# Patient Record
Sex: MALE | Employment: FULL TIME | URBAN - METROPOLITAN AREA
[De-identification: names, ages, dates, MRNs, and addresses within clinical notes are randomized per-mention and may not be internally consistent; named-entity substitution may affect disease eponyms.]

---

## 2019-02-04 ENCOUNTER — HOSPITAL ENCOUNTER (OUTPATIENT)
Age: 54
Setting detail: OBSERVATION
Discharge: HOME | DRG: 683 | End: 2019-02-06
Attending: EMERGENCY MEDICINE | Admitting: GENERAL ACUTE CARE HOSPITAL
Payer: COMMERCIAL

## 2019-02-04 ENCOUNTER — APPOINTMENT (OUTPATIENT)
Dept: GENERAL RADIOLOGY | Age: 54
DRG: 683 | End: 2019-02-04
Attending: EMERGENCY MEDICINE
Payer: COMMERCIAL

## 2019-02-04 ENCOUNTER — APPOINTMENT (OUTPATIENT)
Dept: ULTRASOUND IMAGING | Age: 54
DRG: 683 | End: 2019-02-04
Attending: EMERGENCY MEDICINE
Payer: COMMERCIAL

## 2019-02-04 ENCOUNTER — APPOINTMENT (OUTPATIENT)
Dept: CT IMAGING | Age: 54
DRG: 683 | End: 2019-02-04
Attending: EMERGENCY MEDICINE
Payer: COMMERCIAL

## 2019-02-04 DIAGNOSIS — E86.0 DEHYDRATION: ICD-10-CM

## 2019-02-04 DIAGNOSIS — N17.9 ACUTE KIDNEY INJURY (HCC): Primary | ICD-10-CM

## 2019-02-04 LAB
ALBUMIN SERPL-MCNC: 3.2 G/DL (ref 3.5–5)
ALBUMIN/GLOB SERPL: 0.8 {RATIO} (ref 1.1–2.2)
ALP SERPL-CCNC: 68 U/L (ref 45–117)
ALT SERPL-CCNC: 33 U/L (ref 12–78)
AMORPH CRY URNS QL MICRO: ABNORMAL
ANION GAP SERPL CALC-SCNC: 10 MMOL/L (ref 5–15)
APPEARANCE UR: ABNORMAL
AST SERPL-CCNC: 33 U/L (ref 15–37)
ATRIAL RATE: 69 BPM
BACTERIA URNS QL MICRO: ABNORMAL /HPF
BASOPHILS # BLD: 0 K/UL (ref 0–0.1)
BASOPHILS NFR BLD: 0 % (ref 0–1)
BILIRUB SERPL-MCNC: 0.5 MG/DL (ref 0.2–1)
BILIRUB UR QL CFM: NEGATIVE
BNP SERPL-MCNC: 982 PG/ML (ref 0–125)
BUN SERPL-MCNC: 35 MG/DL (ref 6–20)
BUN/CREAT SERPL: 9 (ref 12–20)
CALCIUM SERPL-MCNC: 8 MG/DL (ref 8.5–10.1)
CALCULATED P AXIS, ECG09: 46 DEGREES
CALCULATED R AXIS, ECG10: 0 DEGREES
CALCULATED T AXIS, ECG11: 108 DEGREES
CHLORIDE SERPL-SCNC: 106 MMOL/L (ref 97–108)
CK SERPL-CCNC: 406 U/L (ref 39–308)
CO2 SERPL-SCNC: 24 MMOL/L (ref 21–32)
COLOR UR: ABNORMAL
CREAT SERPL-MCNC: 3.8 MG/DL (ref 0.7–1.3)
CREAT UR-MCNC: 296 MG/DL
CREAT UR-MCNC: 313 MG/DL
DIAGNOSIS, 93000: NORMAL
DIFFERENTIAL METHOD BLD: ABNORMAL
EOSINOPHIL # BLD: 0 K/UL (ref 0–0.4)
EOSINOPHIL NFR BLD: 0 % (ref 0–7)
EPITH CASTS URNS QL MICRO: ABNORMAL /LPF
ERYTHROCYTE [DISTWIDTH] IN BLOOD BY AUTOMATED COUNT: 13.3 % (ref 11.5–14.5)
GLOBULIN SER CALC-MCNC: 4.2 G/DL (ref 2–4)
GLUCOSE SERPL-MCNC: 142 MG/DL (ref 65–100)
GLUCOSE UR STRIP.AUTO-MCNC: NEGATIVE MG/DL
GRAN CASTS URNS QL MICRO: ABNORMAL /LPF
HCT VFR BLD AUTO: 48.2 % (ref 36.6–50.3)
HGB BLD-MCNC: 16.8 G/DL (ref 12.1–17)
HGB UR QL STRIP: NEGATIVE
IMM GRANULOCYTES # BLD AUTO: 0 K/UL (ref 0–0.04)
IMM GRANULOCYTES NFR BLD AUTO: 0 % (ref 0–0.5)
KETONES UR QL STRIP.AUTO: ABNORMAL MG/DL
LEUKOCYTE ESTERASE UR QL STRIP.AUTO: ABNORMAL
LYMPHOCYTES # BLD: 1.1 K/UL (ref 0.8–3.5)
LYMPHOCYTES NFR BLD: 12 % (ref 12–49)
MCH RBC QN AUTO: 32.2 PG (ref 26–34)
MCHC RBC AUTO-ENTMCNC: 34.9 G/DL (ref 30–36.5)
MCV RBC AUTO: 92.3 FL (ref 80–99)
MICROALBUMIN UR-MCNC: 69.5 MG/DL
MICROALBUMIN/CREAT UR-RTO: 235 MG/G (ref 0–30)
MONOCYTES # BLD: 1 K/UL (ref 0–1)
MONOCYTES NFR BLD: 11 % (ref 5–13)
NEUTS SEG # BLD: 6.5 K/UL (ref 1.8–8)
NEUTS SEG NFR BLD: 76 % (ref 32–75)
NITRITE UR QL STRIP.AUTO: NEGATIVE
NRBC # BLD: 0 K/UL (ref 0–0.01)
NRBC BLD-RTO: 0 PER 100 WBC
OTHER,OTHU: ABNORMAL
P-R INTERVAL, ECG05: 144 MS
PH UR STRIP: 5 [PH] (ref 5–8)
PLATELET # BLD AUTO: 120 K/UL (ref 150–400)
PMV BLD AUTO: 10.8 FL (ref 8.9–12.9)
POTASSIUM SERPL-SCNC: 4 MMOL/L (ref 3.5–5.1)
PROT SERPL-MCNC: 7.4 G/DL (ref 6.4–8.2)
PROT UR STRIP-MCNC: 100 MG/DL
Q-T INTERVAL, ECG07: 422 MS
QRS DURATION, ECG06: 88 MS
QTC CALCULATION (BEZET), ECG08: 452 MS
RBC # BLD AUTO: 5.22 M/UL (ref 4.1–5.7)
RBC #/AREA URNS HPF: ABNORMAL /HPF (ref 0–5)
SODIUM SERPL-SCNC: 140 MMOL/L (ref 136–145)
SODIUM UR-SCNC: 8 MMOL/L
SP GR UR REFRACTOMETRY: 1.03 (ref 1–1.03)
TROPONIN I SERPL-MCNC: 0.09 NG/ML
UA: UC IF INDICATED,UAUC: ABNORMAL
UROBILINOGEN UR QL STRIP.AUTO: 1 EU/DL (ref 0.2–1)
VENTRICULAR RATE, ECG03: 69 BPM
WBC # BLD AUTO: 8.6 K/UL (ref 4.1–11.1)
WBC URNS QL MICRO: ABNORMAL /HPF (ref 0–4)

## 2019-02-04 PROCEDURE — 84300 ASSAY OF URINE SODIUM: CPT

## 2019-02-04 PROCEDURE — 94760 N-INVAS EAR/PLS OXIMETRY 1: CPT

## 2019-02-04 PROCEDURE — 70450 CT HEAD/BRAIN W/O DYE: CPT

## 2019-02-04 PROCEDURE — 74011250636 HC RX REV CODE- 250/636: Performed by: EMERGENCY MEDICINE

## 2019-02-04 PROCEDURE — 93005 ELECTROCARDIOGRAM TRACING: CPT

## 2019-02-04 PROCEDURE — 77030029684 HC NEB SM VOL KT MONA -A

## 2019-02-04 PROCEDURE — 87186 SC STD MICRODIL/AGAR DIL: CPT

## 2019-02-04 PROCEDURE — 99218 HC RM OBSERVATION: CPT

## 2019-02-04 PROCEDURE — 81001 URINALYSIS AUTO W/SCOPE: CPT

## 2019-02-04 PROCEDURE — 87086 URINE CULTURE/COLONY COUNT: CPT

## 2019-02-04 PROCEDURE — 87077 CULTURE AEROBIC IDENTIFY: CPT

## 2019-02-04 PROCEDURE — 76770 US EXAM ABDO BACK WALL COMP: CPT

## 2019-02-04 PROCEDURE — 96361 HYDRATE IV INFUSION ADD-ON: CPT

## 2019-02-04 PROCEDURE — 74011000250 HC RX REV CODE- 250: Performed by: GENERAL ACUTE CARE HOSPITAL

## 2019-02-04 PROCEDURE — 96372 THER/PROPH/DIAG INJ SC/IM: CPT

## 2019-02-04 PROCEDURE — 96360 HYDRATION IV INFUSION INIT: CPT

## 2019-02-04 PROCEDURE — 94640 AIRWAY INHALATION TREATMENT: CPT

## 2019-02-04 PROCEDURE — 84484 ASSAY OF TROPONIN QUANT: CPT

## 2019-02-04 PROCEDURE — 71046 X-RAY EXAM CHEST 2 VIEWS: CPT

## 2019-02-04 PROCEDURE — 83880 ASSAY OF NATRIURETIC PEPTIDE: CPT

## 2019-02-04 PROCEDURE — 82043 UR ALBUMIN QUANTITATIVE: CPT

## 2019-02-04 PROCEDURE — 80053 COMPREHEN METABOLIC PANEL: CPT

## 2019-02-04 PROCEDURE — 74011250637 HC RX REV CODE- 250/637: Performed by: GENERAL ACUTE CARE HOSPITAL

## 2019-02-04 PROCEDURE — 85025 COMPLETE CBC W/AUTO DIFF WBC: CPT

## 2019-02-04 PROCEDURE — 82570 ASSAY OF URINE CREATININE: CPT

## 2019-02-04 PROCEDURE — 74011000250 HC RX REV CODE- 250: Performed by: EMERGENCY MEDICINE

## 2019-02-04 PROCEDURE — 36415 COLL VENOUS BLD VENIPUNCTURE: CPT

## 2019-02-04 PROCEDURE — 82550 ASSAY OF CK (CPK): CPT

## 2019-02-04 PROCEDURE — 99285 EMERGENCY DEPT VISIT HI MDM: CPT

## 2019-02-04 PROCEDURE — 74011250636 HC RX REV CODE- 250/636: Performed by: GENERAL ACUTE CARE HOSPITAL

## 2019-02-04 RX ORDER — LIDOCAINE HYDROCHLORIDE 20 MG/ML
15 SOLUTION OROPHARYNGEAL
Status: DISCONTINUED | OUTPATIENT
Start: 2019-02-04 | End: 2019-02-06 | Stop reason: HOSPADM

## 2019-02-04 RX ORDER — ONDANSETRON 2 MG/ML
4 INJECTION INTRAMUSCULAR; INTRAVENOUS
Status: DISCONTINUED | OUTPATIENT
Start: 2019-02-04 | End: 2019-02-06 | Stop reason: HOSPADM

## 2019-02-04 RX ORDER — IBUPROFEN 200 MG
400 TABLET ORAL
COMMUNITY
End: 2019-02-06

## 2019-02-04 RX ORDER — ASPIRIN 325 MG/1
100 TABLET, FILM COATED ORAL DAILY
Status: DISCONTINUED | OUTPATIENT
Start: 2019-02-05 | End: 2019-02-06 | Stop reason: HOSPADM

## 2019-02-04 RX ORDER — HEPARIN SODIUM 5000 [USP'U]/ML
5000 INJECTION, SOLUTION INTRAVENOUS; SUBCUTANEOUS EVERY 8 HOURS
Status: DISCONTINUED | OUTPATIENT
Start: 2019-02-04 | End: 2019-02-06 | Stop reason: HOSPADM

## 2019-02-04 RX ORDER — CLOPIDOGREL BISULFATE 75 MG/1
75 TABLET ORAL DAILY
COMMUNITY

## 2019-02-04 RX ORDER — IPRATROPIUM BROMIDE AND ALBUTEROL SULFATE 2.5; .5 MG/3ML; MG/3ML
3 SOLUTION RESPIRATORY (INHALATION)
Status: DISCONTINUED | OUTPATIENT
Start: 2019-02-04 | End: 2019-02-06 | Stop reason: HOSPADM

## 2019-02-04 RX ORDER — METOPROLOL SUCCINATE 50 MG/1
25 TABLET, EXTENDED RELEASE ORAL DAILY
Status: DISCONTINUED | OUTPATIENT
Start: 2019-02-05 | End: 2019-02-04

## 2019-02-04 RX ORDER — CLOPIDOGREL BISULFATE 75 MG/1
75 TABLET ORAL DAILY
Status: DISCONTINUED | OUTPATIENT
Start: 2019-02-05 | End: 2019-02-06 | Stop reason: HOSPADM

## 2019-02-04 RX ORDER — SODIUM CHLORIDE 9 MG/ML
75 INJECTION, SOLUTION INTRAVENOUS CONTINUOUS
Status: DISCONTINUED | OUTPATIENT
Start: 2019-02-04 | End: 2019-02-04 | Stop reason: SDUPTHER

## 2019-02-04 RX ORDER — FOLIC ACID 1 MG/1
1 TABLET ORAL DAILY
Status: DISCONTINUED | OUTPATIENT
Start: 2019-02-05 | End: 2019-02-06 | Stop reason: HOSPADM

## 2019-02-04 RX ORDER — AMLODIPINE BESYLATE 10 MG/1
10 TABLET ORAL DAILY
COMMUNITY

## 2019-02-04 RX ORDER — LORAZEPAM 2 MG/ML
2 INJECTION INTRAMUSCULAR
Status: DISCONTINUED | OUTPATIENT
Start: 2019-02-04 | End: 2019-02-06 | Stop reason: HOSPADM

## 2019-02-04 RX ORDER — IPRATROPIUM BROMIDE AND ALBUTEROL SULFATE 2.5; .5 MG/3ML; MG/3ML
3 SOLUTION RESPIRATORY (INHALATION)
Status: COMPLETED | OUTPATIENT
Start: 2019-02-04 | End: 2019-02-04

## 2019-02-04 RX ORDER — SODIUM CHLORIDE 9 MG/ML
75 INJECTION, SOLUTION INTRAVENOUS CONTINUOUS
Status: DISCONTINUED | OUTPATIENT
Start: 2019-02-04 | End: 2019-02-06 | Stop reason: HOSPADM

## 2019-02-04 RX ORDER — AMLODIPINE BESYLATE 5 MG/1
10 TABLET ORAL DAILY
Status: DISCONTINUED | OUTPATIENT
Start: 2019-02-05 | End: 2019-02-04

## 2019-02-04 RX ORDER — SODIUM CHLORIDE 0.9 % (FLUSH) 0.9 %
5-40 SYRINGE (ML) INJECTION AS NEEDED
Status: DISCONTINUED | OUTPATIENT
Start: 2019-02-04 | End: 2019-02-06 | Stop reason: HOSPADM

## 2019-02-04 RX ORDER — SODIUM CHLORIDE 0.9 % (FLUSH) 0.9 %
5-40 SYRINGE (ML) INJECTION EVERY 8 HOURS
Status: DISCONTINUED | OUTPATIENT
Start: 2019-02-04 | End: 2019-02-06 | Stop reason: HOSPADM

## 2019-02-04 RX ORDER — HYDRALAZINE HYDROCHLORIDE 20 MG/ML
10 INJECTION INTRAMUSCULAR; INTRAVENOUS
Status: DISCONTINUED | OUTPATIENT
Start: 2019-02-04 | End: 2019-02-06 | Stop reason: HOSPADM

## 2019-02-04 RX ORDER — LORAZEPAM 2 MG/ML
4 INJECTION INTRAMUSCULAR
Status: DISCONTINUED | OUTPATIENT
Start: 2019-02-04 | End: 2019-02-06 | Stop reason: HOSPADM

## 2019-02-04 RX ORDER — METOPROLOL SUCCINATE 50 MG/1
50 TABLET, EXTENDED RELEASE ORAL DAILY
Status: ON HOLD | COMMUNITY
End: 2022-07-12

## 2019-02-04 RX ORDER — IRBESARTAN 300 MG/1
300 TABLET ORAL DAILY
COMMUNITY
End: 2019-02-06

## 2019-02-04 RX ORDER — FLUTICASONE PROPIONATE 50 MCG
2 SPRAY, SUSPENSION (ML) NASAL
COMMUNITY

## 2019-02-04 RX ORDER — ACETAMINOPHEN 325 MG/1
650 TABLET ORAL
Status: DISCONTINUED | OUTPATIENT
Start: 2019-02-04 | End: 2019-02-06 | Stop reason: HOSPADM

## 2019-02-04 RX ADMIN — SODIUM CHLORIDE 1000 ML: 900 INJECTION, SOLUTION INTRAVENOUS at 12:04

## 2019-02-04 RX ADMIN — HEPARIN SODIUM 5000 UNITS: 5000 INJECTION INTRAVENOUS; SUBCUTANEOUS at 17:25

## 2019-02-04 RX ADMIN — ACETAMINOPHEN 650 MG: 325 TABLET ORAL at 19:59

## 2019-02-04 RX ADMIN — IPRATROPIUM BROMIDE AND ALBUTEROL SULFATE 3 ML: .5; 3 SOLUTION RESPIRATORY (INHALATION) at 12:03

## 2019-02-04 RX ADMIN — SODIUM CHLORIDE 1000 ML: 900 INJECTION, SOLUTION INTRAVENOUS at 13:34

## 2019-02-04 RX ADMIN — SODIUM CHLORIDE 75 ML/HR: 900 INJECTION, SOLUTION INTRAVENOUS at 17:28

## 2019-02-04 RX ADMIN — IPRATROPIUM BROMIDE AND ALBUTEROL SULFATE 3 ML: .5; 3 SOLUTION RESPIRATORY (INHALATION) at 19:21

## 2019-02-04 RX ADMIN — Medication 10 ML: at 17:28

## 2019-02-04 NOTE — H&P
Hospitalist Admission NoteNAME: Marcela Carr :  1965 MRN:  [id number] Date/Time:  2019 1:23 PM 
 
Patient PCP: Vasiliy Griffin MD 
______________________________________________________________________ Given the patient's current clinical presentation, I have a high level of concern for decompensation if discharged from the emergency department. Complex decision making was performed, which includes reviewing the patient's available past medical records, laboratory results, and x-ray films. My assessment of this patient's clinical condition and my plan of care is as follows. Assessment / Plan: MARYOJ vs MARYJO on CKD, secondary to dehydration? Near syncopal episodes, likely secondary to above 
-Admit for Obs 
-IVF bolus and continuous hydration 
-UA pending 
-Cr 3.8 - pt denies any history of kidney disease or prostate problems 
-Pt denies adequate PO intake since he developed \"a scratch in his throat\" on Friday 
-He denies any difficulty in urinating - therefore will not place olmedo right now.  
-Monitor I/Os 
-Renal US pending - IMPRESSION: Small cyst lower pole left kidney. Otherwise, normal renal ultrasound examination. 
-Urine lytes and studies pending -Nephrology Consulted by the ER 
-Avoid nephrotoxic medications Hx of HTN 
-Unclear what PO meds he is on - but if ACEi/ARB, then it would've likely played a role in pt's MARYJO Addendum: Pharmacy med rec noted Will continue Norvasc 10mg, hold Avapro 300mg, and continue Metop XL but at decreased dose of 25mg. ?CAD 
-Pt unclear about his cardiac history - does take Plavix however. EtOH Abuse 
-Pt states that his last drink was on Wednesday or Thursday 
-[facility] protocol 
-MVT/FA/Thiamine Code Status: Full Surrogate Decision Maker: Mother DVT Prophylaxis: Heparin GI Prophylaxis: not indicated Baseline: Independent Subjective: CHIEF COMPLAINT: feeling unwell, dizzy HISTORY OF PRESENT ILLNESS:    
Maya Sacks is a 48 y.o.  male who presents with CC listed above. Pt states that since Friday he has been feeling unwell, and it was because he noticed a scratch in his throat. He states that he has been \"dealing with that all weekend\" and therefore he went to his PCP's office this morning. At his PCP's office, pt states that he started to feel \"hot and cold\" and also complained of dizziness. Pt is unclear whether he had a syncopal event or not. EMS was called and pt was brought to the hospital. Pt denies fever, chills, SOB or CP prior to, during or after the event. Pt admits to some nausea, but denies any vomiting or diarrhea. He does endorse 1 loose BM this morning. He does also endorse taking 1 tablet of Aleve this morning. Of note, he states that he is trying to quit binge drinking - with his last drink at some time on Wednesday or Thursday. Pt denies any withdrawal symptoms. Currently he is lying in bed without any complaints. We were asked to admit for work up and evaluation of the above problems. No past medical history on file. Pt has history of HTN. No past surgical history on file. Pt denies any surgical history. Social History Tobacco Use  Smoking status: Not on file Substance Use Topics  Alcohol use: Not on file No family history on file. Maternal history of HTN. Not on File Prior to Admission medications Not on File REVIEW OF SYSTEMS:    
I am not able to complete the review of systems because: The patient is intubated and sedated The patient has altered mental status due to his acute medical problems The patient has baseline aphasia from prior stroke(s) The patient has baseline dementia and is not reliable historian The patient is in acute medical distress and unable to provide information Total of 12 systems reviewed as follows: POSITIVE= underlined text  Negative = text not underlined General:  fever, chills, sweats, generalized weakness, weight loss/gain,  
   loss of appetite Eyes:    blurred vision, eye pain, loss of vision, double vision ENT:    rhinorrhea, pharyngitis Respiratory:   cough, sputum production, SOB, GONZALEZ, wheezing, pleuritic pain  
Cardiology:   chest pain, palpitations, orthopnea, PND, edema, syncope Gastrointestinal:  abdominal pain , N/V, diarrhea, dysphagia, constipation, bleeding Genitourinary:  frequency, urgency, dysuria, hematuria, incontinence Muskuloskeletal :  arthralgia, myalgia, back pain Hematology:  easy bruising, nose or gum bleeding, lymphadenopathy Dermatological: rash, ulceration, pruritis, color change / jaundice Endocrine:   hot flashes or polydipsia Neurological:  headache, dizziness, confusion, focal weakness, paresthesia, Speech difficulties, memory loss, gait difficulty Psychological: Feelings of anxiety, depression, agitation Objective: VITALS:   
Visit Vitals BP 98/74 Pulse 68 Temp 97.8 °F (36.6 °C) Resp 20 Ht 5' 8\" (1.727 m) Wt 255 lb (115.7 kg) SpO2 96% BMI 38.77 kg/m² PHYSICAL EXAM: 
 
General:    Alert, cooperative, no distress, appears stated age. HEENT: Atraumatic, anicteric sclerae, pink conjunctivae No oral ulcers, mucosa dry, throat clear, dentition fair Neck:  Supple, symmetrical,  thyroid: non tender Lungs:   Clear to auscultation bilaterally. No Wheezing or Rhonchi. No rales. Chest wall:  No tenderness  No Accessory muscle use. Heart:   Regular  rhythm,  No  murmur   No edema Abdomen:   Soft, non-tender. Not distended. Bowel sounds normal 
Extremities: No cyanosis. No clubbing,   
  Skin turgor normal, Capillary refill normal, Radial dial pulse 2+ Skin:     Not pale. Not Jaundiced  No rashes Psych:  Good insight. Not depressed. Not anxious or agitated. Neurologic: EOMs intact. No facial asymmetry. No aphasia or slurred speech. Symmetrical strength, Sensation grossly intact. Alert and oriented X 4.  
 
_______________________________________________________________________ Care Plan discussed with: 
  Comments Patient x Family  x   
RN x Care Manager Consultant:     
_______________________________________________________________________ Expected  Disposition:  
Home with Family HH/PT/OT/RN x  
SNF/LTC   
KIP   
________________________________________________________________________ TOTAL TIME:  55 Minutes Critical Care Provided     Minutes non procedure based Comments Reviewed previous records  
>50% of visit spent in counseling and coordination of care  Discussion with patient and/or family and questions answered 
  
 
________________________________________________________________________ Signed: Tony Flores MD 
 
Procedures: see electronic medical records for all procedures/Xrays and details which were not copied into this note but were reviewed prior to creation of Plan. LAB DATA REVIEWED:   
Recent Results (from the past 24 hour(s)) EKG, 12 LEAD, INITIAL Collection Time: 02/04/19 10:20 AM  
Result Value Ref Range Ventricular Rate 69 BPM  
 Atrial Rate 69 BPM  
 P-R Interval 144 ms QRS Duration 88 ms Q-T Interval 422 ms QTC Calculation (Bezet) 452 ms Calculated P Axis 46 degrees Calculated R Axis 0 degrees Calculated T Axis 108 degrees Diagnosis Normal sinus rhythm Possible Left atrial enlargement Left ventricular hypertrophy Cannot rule out Septal infarct , age undetermined T wave abnormality, consider lateral ischemia CBC WITH AUTOMATED DIFF Collection Time: 02/04/19 11:19 AM  
Result Value Ref Range WBC 8.6 4.1 - 11.1 K/uL  
 RBC 5.22 4.10 - 5.70 M/uL  
 HGB 16.8 12.1 - 17.0 g/dL HCT 48.2 36.6 - 50.3 %  MCV 92.3 80.0 - 99.0 FL  
 MCH 32.2 26.0 - 34.0 PG  
 MCHC 34.9 30.0 - 36.5 g/dL  
 RDW 13.3 11.5 - 14.5 % PLATELET 402 (L) 548 - 400 K/uL MPV 10.8 8.9 - 12.9 FL  
 NRBC 0.0 0  WBC ABSOLUTE NRBC 0.00 0.00 - 0.01 K/uL NEUTROPHILS 76 (H) 32 - 75 % LYMPHOCYTES 12 12 - 49 % MONOCYTES 11 5 - 13 % EOSINOPHILS 0 0 - 7 % BASOPHILS 0 0 - 1 % IMMATURE GRANULOCYTES 0 0.0 - 0.5 % ABS. NEUTROPHILS 6.5 1.8 - 8.0 K/UL  
 ABS. LYMPHOCYTES 1.1 0.8 - 3.5 K/UL  
 ABS. MONOCYTES 1.0 0.0 - 1.0 K/UL  
 ABS. EOSINOPHILS 0.0 0.0 - 0.4 K/UL  
 ABS. BASOPHILS 0.0 0.0 - 0.1 K/UL  
 ABS. IMM. GRANS. 0.0 0.00 - 0.04 K/UL  
 DF AUTOMATED METABOLIC PANEL, COMPREHENSIVE Collection Time: 02/04/19 11:19 AM  
Result Value Ref Range Sodium 140 136 - 145 mmol/L Potassium 4.0 3.5 - 5.1 mmol/L Chloride 106 97 - 108 mmol/L  
 CO2 24 21 - 32 mmol/L Anion gap 10 5 - 15 mmol/L Glucose 142 (H) 65 - 100 mg/dL BUN 35 (H) 6 - 20 MG/DL Creatinine 3.80 (H) 0.70 - 1.30 MG/DL  
 BUN/Creatinine ratio 9 (L) 12 - 20 GFR est AA 20 (L) >60 ml/min/1.73m2 GFR est non-AA 17 (L) >60 ml/min/1.73m2 Calcium 8.0 (L) 8.5 - 10.1 MG/DL Bilirubin, total 0.5 0.2 - 1.0 MG/DL  
 ALT (SGPT) 33 12 - 78 U/L  
 AST (SGOT) 33 15 - 37 U/L Alk. phosphatase 68 45 - 117 U/L Protein, total 7.4 6.4 - 8.2 g/dL Albumin 3.2 (L) 3.5 - 5.0 g/dL Globulin 4.2 (H) 2.0 - 4.0 g/dL A-G Ratio 0.8 (L) 1.1 - 2.2    
TROPONIN I Collection Time: 02/04/19 11:19 AM  
Result Value Ref Range Troponin-I, Qt. 0.09 (H) <0.05 ng/mL NT-PRO BNP Collection Time: 02/04/19 11:19 AM  
Result Value Ref Range  NT pro- (H) 0 - 125 PG/ML

## 2019-02-04 NOTE — CONSULTS
Consultation Note    NAME: Bulmaro Chen   :  1965   MRN:  [id number]     Date/Time:  2019 2:29 PM    I have been asked to see this patient by Dr. Jose Alejandro Raphael  for advice/opinion re: MARYJO. Assessment :    Plan:  MARYJO  proteinuria  Hypotension  Volume depletion  H/o CAD-Dr. Mansoor Anglin   Last creatinine with Dr. Paco Prieto in  was 1.46. Urine with protein - no blood - I'll check UACR. U/S shows normal kidneys with normal echogenicity which would be more consistent with MARYJO versus CKD. I'll also check a CK. I suspect that his MARYJO is related to volume depletion on top of his outpatient ARB use. I've held his anti-hypertensives given his low BP. Start IVF. Subjective:   CHIEF COMPLAINT:  \"I was dizzy. \"    HISTORY OF PRESENT ILLNESS:     Alli is a 48 y.o.   male who has a history of CAD and HTN admitted with MARYJO. He says that he started to feel bad on Friday. He though maybe it was a virus. He had a cough, headache, and sore throat. He says that he felt cold and hot and was dizzy. He says that his po intake was poor. He says that he took some ibuprofen over the weekend. He went to see Dr. Paco Prieto this morning. I talked with Dr. Paco Prieto who said that the patient was so sick that he was unable to walk and they had to get him in a wheelchair. They got him up on the exam table and they were unable to get his BP. He lied flat and still had dizzyness. The patient said the room was spinning. They called EMS and he was taken to the ER where he has been admitted. No past medical history on file. No past surgical history on file. Social History     Tobacco Use    Smoking status: Not on file   Substance Use Topics    Alcohol use: Not on file      No family history on file. Not on File   Prior to Admission medications    Medication Sig Start Date End Date Taking?  Authorizing Provider   ibuprofen (MOTRIN) 200 mg tablet Take 400 mg by mouth every six (6) hours as needed for Pain.   Yes Other, MD Opal   amLODIPine (NORVASC) 10 mg tablet Take 10 mg by mouth daily. Yes Tila, MD Opal   clopidogrel (PLAVIX) 75 mg tab Take 75 mg by mouth daily. Yes Other, MD Opal   irbesartan (AVAPRO) 300 mg tablet Take 300 mg by mouth daily. Yes Other, MD Opal   metoprolol succinate (TOPROL-XL) 50 mg XL tablet Take 50 mg by mouth daily. Yes Tila, MD Opal   fluticasone (FLONASE) 50 mcg/actuation nasal spray 2 Sprays by Both Nostrils route two (2) times daily as needed for Rhinitis. Yes Other, MD Opal   mv-min-C-glutamin-lysine-hb124 (A[place]NE, LYSINE HCL,) 250-12.5 mg chew Take 1 Tab by mouth daily.    Yes Other, MD Opal     REVIEW OF SYSTEMS:     []  Unable to obtain reliable ROS due to  [] mental status  [] sedated   [] intubated   [x] Total of 12 systems reviewed as follows:  Constitutional: negative fever, pos chills, negative weight loss  Eyes:   negative visual changes  ENT:   pos sore throat,no tongue or lip swelling  Respiratory:  negative cough, negative dyspnea  Cards:  negative for chest pain, palpitations, lower extremity edema  GI:   pos for nausea, vomiting, diarrhea, and no abdominal pain  :  negative for frequency, dysuria  Integument:  negative for rash and pruritus  Heme:  negative for easy bruising and gum/nose bleeding  Musculoskel: negative for myalgias,  back pain and muscle weakness  Neuro:  negative for headaches, dizziness, vertigo  Psych:  negative for feelings of anxiety, depression   Travel?: none    Objective:   VITALS:    Visit Vitals  BP 98/74   Pulse 68   Temp 97.8 °F (36.6 °C)   Resp 20   Ht 5' 8\" (1.727 m)   Wt 115.7 kg (255 lb)   SpO2 96%   BMI 38.77 kg/m²     PHYSICAL EXAM:  Gen:  [x]  WD [x]  WN  [] cachectic []  thin []  obese []  disheveled             []  ill apearing  []   Critical  []   Chronic    [x]  No acute distress    HEENT:   [x] NC/AT/PERRL    [x] pink conjunctivae      [] pale conjunctivae                  PERRL  [] yes  [] no      [] moist mucosa    [] dry mucosa    hearing intact to voice [] yes  [] No                 NECK:   supple [x] yes  [] no        masses [] yes  [x] No               thyroid  []  non tender  []  tender    RESP:   [x] CTA bilaterally/no wheezing/rhonchi/rales/crackles    [] rhonchi bilaterally - no dullness  [] wheezing   [] rhonchi   [] crackles     use of accessory muscles [] yes [] no    CARD:   [x]  regular rate and rhythm/No murmurs/rubs/gallops    murmur  [] yes ()  [] no      Rubs  [] yes  [] no       Gallops [] yes  [] no    Rate []  regular  []  irregular        carotid bruits  [] Right  []  Left                 LE edema [] yes  [x] no           JVP  []  yes   []  no    ABD:    [x] soft/non distended/non tender/+bowel sounds/no HSM    []  Rigid    tenderness [] yes [] no   Liver enlargement  []  yes []  no                Spleen enlargement  []  yes []  no     distended []  yes [] no     bowel sound  [] hypoactive   [] hyperactive    LYMPH:    Neck []  yes [x]  no       Axillae []  yes [x]  no    SKIN:   Rashes []  yes   [x]  no    Ulcers []  yes   [x]  no               [] tight to palpitation    skin turgor []  good  [] poor  [] decreased               Cyanosis/clubbing []  yes []  no    NEUR:   [x] cranial nerves II-XII grossly intact       [] Cranial nerves deficit                 []  facial droop    []  slurred speech   [] aphasic     [] Strength normal     []  weakness  []  LUE  []   RUE/ []  LLE  []   RLE    follows commands  [x]  yes []  no           PSYCH:   insight [] poor [x] good   Alert and Oriented to  [x] person  [x] place  [x]  time                    [] depressed [] anxious [] agitated  [] lethargic [] stuporous  [] sedated     LAB DATA REVIEWED:    Recent Labs     02/04/19  1119   WBC 8.6   HGB 16.8   HCT 48.2   *     Recent Labs     02/04/19  1119      K 4.0      CO2 24   BUN 35*   CREA 3.80*   *   CA 8.0*     Recent Labs     02/04/19  1119   SGOT 33   ALT 33   AP 68 TBILI 0.5   ALB 3.2*   GLOB 4.2*     No results for input(s): INR, PTP, APTT in the last 72 hours. No lab exists for component: INREXT   No results for input(s): FE, TIBC, PSAT, FERR in the last 72 hours. No results for input(s): PH, PCO2, PO2 in the last 72 hours. No results for input(s): CPK, CKMB in the last 72 hours.     No lab exists for component: TROPONINI  No results found for: GLUCPOC    Procedures: see electronic medical records for all procedures/Xrays and details which were not copied into this note but were reviewed prior to creation of Plan.    ________________________________________________________________________       ___________________________________________________  Consulting Physician: Quintin Stark MD

## 2019-02-04 NOTE — PROGRESS NOTES
TRANSFER - IN REPORT: 
 
Verbal report received from Ana(jennifer) on Mesa Clause  being received from ED(unit) for routine progression of care Report consisted of patients Situation, Background, Assessment and  
Recommendations(SBAR). Some information provided. Opportunity for questions and clarification was provided. Assessment completed upon patients arrival to unit and care assumed.

## 2019-02-04 NOTE — ED PROVIDER NOTES
EMERGENCY DEPARTMENT HISTORY AND PHYSICAL EXAM 
 
 
Date: 2/4/2019 Patient Name: Jonelle Funez History of Presenting Illness Chief Complaint Patient presents with  
 Headache  
  x 4 days with n/v.  sent over by pcp for possible syncope. History Provided By: Patient HPI: Jonelle Funez, 48 y.o. male with no pertinent PMHx, presents via EMS to the ED with cc of new onset room spinning dizziness with near syncope while at PCP's office PTA. Pt reports associated sx of gait instability, sore throat, dry cough, generalized headache, and diffuse upper abdominal soreness as well. He expresses he has had a sore throat, generalized headache and cough persisting over the last week and presented to see his PCP regarding his sx. While at PCP's office pt became acutely dizzy and experienced a possible syncopal event leading staff to call EMS. Pt discloses no exacerbating factors to his sx and has found minimal relief in his headache with Aleve. He denies any fevers, chills, chest pain, SOB, nausea, vomiting, diarrhea, hematochezia, melena, dysuria, or hematuria. There are no other complaints, changes, or physical findings at this time. PCP: Tila, MD Opal 
SHx: (+)Tobacco; (-) ETOH; (-) Illicit drug use No current facility-administered medications on file prior to encounter. No current outpatient medications on file prior to encounter. Past History Past Medical History: No past medical history on file. Past Surgical History: No past surgical history on file. Family History: No family history on file. Social History: 
Social History Tobacco Use  Smoking status: Not on file Substance Use Topics  Alcohol use: Not on file  Drug use: Not on file Allergies: Allergies not on file Review of Systems Review of Systems Constitutional: Negative for chills and fever. HENT: Positive for sore throat. Negative for congestion. Eyes: Negative for visual disturbance. Respiratory: Positive for cough. Negative for shortness of breath. Cardiovascular: Negative for chest pain and leg swelling. Gastrointestinal: Positive for abdominal pain (upper ). Negative for blood in stool, diarrhea, nausea and vomiting. Endocrine: Negative for polyuria. Genitourinary: Negative for dysuria and testicular pain. Musculoskeletal: Positive for gait problem. Negative for arthralgias, joint swelling and myalgias. Skin: Negative for rash. Allergic/Immunologic: Negative for immunocompromised state. Neurological: Positive for dizziness, syncope and headaches (generalized). Negative for weakness. Hematological: Does not bruise/bleed easily. Psychiatric/Behavioral: Negative for confusion. Physical Exam  
Physical Exam  
Constitutional: He is oriented to person, place, and time. He appears well-developed and well-nourished. HENT:  
Head: Normocephalic and atraumatic. DRY mucous membranes Eyes: Conjunctivae are normal. Pupils are equal, round, and reactive to light. Right eye exhibits no discharge. Left eye exhibits no discharge. Neck: Normal range of motion. Neck supple. No tracheal deviation present. Cardiovascular: Normal rate, regular rhythm and normal heart sounds. No murmur heard. Pulmonary/Chest: Effort normal. No respiratory distress. He has wheezes (diffuse). He has no rales. Coarse breath sounds Abdominal: Soft. Bowel sounds are normal. There is no tenderness. There is no rebound and no guarding. Musculoskeletal: Normal range of motion. He exhibits no edema, tenderness or deformity. Neurological: He is alert and oriented to person, place, and time. Skin: Skin is warm and dry. No rash noted. No erythema. Psychiatric: His behavior is normal.  
Nursing note and vitals reviewed. Diagnostic Study Results Labs - Recent Results (from the past 12 hour(s)) EKG, 12 LEAD, INITIAL Collection Time: 02/04/19 10:20 AM  
Result Value Ref Range Ventricular Rate 69 BPM  
 Atrial Rate 69 BPM  
 P-R Interval 144 ms QRS Duration 88 ms Q-T Interval 422 ms QTC Calculation (Bezet) 452 ms Calculated P Axis 46 degrees Calculated R Axis 0 degrees Calculated T Axis 108 degrees Diagnosis Normal sinus rhythm Possible Left atrial enlargement Left ventricular hypertrophy Cannot rule out Septal infarct , age undetermined T wave abnormality, consider lateral ischemia Confirmed by Alida Cooper ([id number]) on 2/4/2019 2:17:20 PM 
  
CBC WITH AUTOMATED DIFF Collection Time: 02/04/19 11:19 AM  
Result Value Ref Range WBC 8.6 4.1 - 11.1 K/uL  
 RBC 5.22 4.10 - 5.70 M/uL  
 HGB 16.8 12.1 - 17.0 g/dL HCT 48.2 36.6 - 50.3 % MCV 92.3 80.0 - 99.0 FL  
 MCH 32.2 26.0 - 34.0 PG  
 MCHC 34.9 30.0 - 36.5 g/dL  
 RDW 13.3 11.5 - 14.5 % PLATELET 891 (L) 064 - 400 K/uL MPV 10.8 8.9 - 12.9 FL  
 NRBC 0.0 0  WBC ABSOLUTE NRBC 0.00 0.00 - 0.01 K/uL NEUTROPHILS 76 (H) 32 - 75 % LYMPHOCYTES 12 12 - 49 % MONOCYTES 11 5 - 13 % EOSINOPHILS 0 0 - 7 % BASOPHILS 0 0 - 1 % IMMATURE GRANULOCYTES 0 0.0 - 0.5 % ABS. NEUTROPHILS 6.5 1.8 - 8.0 K/UL  
 ABS. LYMPHOCYTES 1.1 0.8 - 3.5 K/UL  
 ABS. MONOCYTES 1.0 0.0 - 1.0 K/UL  
 ABS. EOSINOPHILS 0.0 0.0 - 0.4 K/UL  
 ABS. BASOPHILS 0.0 0.0 - 0.1 K/UL  
 ABS. IMM. GRANS. 0.0 0.00 - 0.04 K/UL  
 DF AUTOMATED METABOLIC PANEL, COMPREHENSIVE Collection Time: 02/04/19 11:19 AM  
Result Value Ref Range Sodium 140 136 - 145 mmol/L Potassium 4.0 3.5 - 5.1 mmol/L Chloride 106 97 - 108 mmol/L  
 CO2 24 21 - 32 mmol/L Anion gap 10 5 - 15 mmol/L Glucose 142 (H) 65 - 100 mg/dL BUN 35 (H) 6 - 20 MG/DL Creatinine 3.80 (H) 0.70 - 1.30 MG/DL  
 BUN/Creatinine ratio 9 (L) 12 - 20 GFR est AA 20 (L) >60 ml/min/1.73m2 GFR est non-AA 17 (L) >60 ml/min/1.73m2  Calcium 8.0 (L) 8.5 - 10.1 MG/DL  
 Bilirubin, total 0.5 0.2 - 1.0 MG/DL  
 ALT (SGPT) 33 12 - 78 U/L  
 AST (SGOT) 33 15 - 37 U/L Alk. phosphatase 68 45 - 117 U/L Protein, total 7.4 6.4 - 8.2 g/dL Albumin 3.2 (L) 3.5 - 5.0 g/dL Globulin 4.2 (H) 2.0 - 4.0 g/dL A-G Ratio 0.8 (L) 1.1 - 2.2    
TROPONIN I Collection Time: 02/04/19 11:19 AM  
Result Value Ref Range Troponin-I, Qt. 0.09 (H) <0.05 ng/mL NT-PRO BNP Collection Time: 02/04/19 11:19 AM  
Result Value Ref Range NT pro- (H) 0 - 125 PG/ML  
URINALYSIS W/ REFLEX CULTURE Collection Time: 02/04/19  1:00 PM  
Result Value Ref Range Color DARK YELLOW Appearance CLOUDY (A) CLEAR Specific gravity 1.027 1.003 - 1.030    
 pH (UA) 5.0 5.0 - 8.0 Protein 100 (A) NEG mg/dL Glucose NEGATIVE  NEG mg/dL Ketone TRACE (A) NEG mg/dL Blood NEGATIVE  NEG Urobilinogen 1.0 0.2 - 1.0 EU/dL Nitrites NEGATIVE  NEG Leukocyte Esterase SMALL (A) NEG    
 WBC 10-20 0 - 4 /hpf  
 RBC 0-5 0 - 5 /hpf Epithelial cells FEW FEW /lpf Bacteria 2+ (A) NEG /hpf  
 UA:UC IF INDICATED URINE CULTURE ORDERED (A) CNI Amorphous Crystals 1+ (A) NEG  
 Granular cast 0-2 (A) NEG /lpf Other: Renal Epithelial cells Present CREATININE, UR, RANDOM Collection Time: 02/04/19  1:00 PM  
Result Value Ref Range Creatinine, urine 313.00 mg/dL BILIRUBIN, CONFIRM Collection Time: 02/04/19  1:00 PM  
Result Value Ref Range Bilirubin UA, confirm NEGATIVE  NEG Radiologic Studies -  
US RETROPERITONEUM COMP Final Result IMPRESSION: Small cyst lower pole left kidney. Otherwise, normal renal  
ultrasound examination. XR CHEST PA LAT Final Result Impression: 1. The lungs are clear 2. Rounded dense sclerotic area as described above. Dedicated views of the left  
ribs are suggested. CT HEAD WO CONT Final Result IMPRESSION: Normal unenhanced CT examination of the head. Medical Decision Making I am the first provider for this patient. I reviewed the vital signs, available nursing notes, past medical history, past surgical history, family history and social history. Vital Signs-Reviewed the patient's vital signs. Patient Vitals for the past 12 hrs: 
 Temp Pulse Resp BP SpO2  
02/04/19 1115  68 20 98/74 96 % 02/04/19 1016 97.8 °F (36.6 °C) 72 22 100/67 100 % Pulse Oximetry Analysis - 100% on room air Cardiac Monitor:  
Rate: 72 bpm 
Rhythm: Normal Sinus Rhythm EKG interpretation: (Preliminary) 1020 Rhythm: normal sinus rhythm; and regular . Rate (approx.): 69; Axis: normal; MD interval: 144; QRS interval: 88; ST/T wave: non specific lateral T wave changes; QT/QTc: 422/452; Other findings: non ischemic. Records Reviewed: Nursing Notes, Old Medical Records, Previous Radiology Studies and Previous Laboratory Studies Provider Notes (Medical Decision Making): DDx: Dehydration, intracranial bleed, near syncope, syncope, seizure, vasovagal, arrhythmia. ED Course:  
Initial assessment performed. The patients presenting problems have been discussed, and they are in agreement with the care plan formulated and outlined with them. I have encouraged them to ask questions as they arise throughout their visit. Progress Notes: 
   
12:23 PM  
The pt has been re-evaluated. Discussed lab results with pt. Pt admits to taking antiinflammatory for headache at home. Pt updated on warrant for admission at this time. Troponin mildly elevated, likely secondary to MARYJO, will need to trend on hospitalization, no acute ischemia on EKG, doubt ACS 
 
CONSULT NOTE: 
1:15 PM 
Almaz Hudson DO spoke with Dr. Bryce Topete, Specialty: nephrology Discussed pt's hx, disposition, and available diagnostic and imaging results. Reviewed care plans. Consultant recommends he will consult on the pt. Written by Emanuel Enriquez ED Scribe, as dictated by Almaz Hudson DO  
 
CONSULT NOTE:  
1:30 PM 
 Tiana Sawyer DO spoke with Dr. Roro Lara, Specialty: Hospitalist 
Discussed pt's hx, disposition, and available diagnostic and imaging results. Reviewed care plans. Consultant will evaluate pt for admission. Written by Richi Gibson, ED Scribe, as dictated by Tiana Sawyer DO. Critical Care Time: 0 minutes Disposition: 
Admit Note: 
1:30 PM 
Patient is being admitted to the hospital by Dr. Roro Lara. The results of their tests and reasons for their admission have been discussed with the patient and/or available family. They convey their agreement and understanding for the need to be admitted and for their admission diagnosis. Written by Riley Enriquez, MARCI Scribe, as dictated by Tiana Sawyer DO. PLAN: 
1. Admission Diagnosis Clinical Impression: No diagnosis found. Attestations: 
 
Attestation: This note is prepared by Sherrell Enriquez, acting as Scribe for Tiana Sawyer DO. Tiana Sawyer DO: The scribe's documentation has been prepared under my direction and personally reviewed by me in its entirety. I confirm that the note above accurately reflects all work, treatment, procedures, and medical decision making performed by me.

## 2019-02-04 NOTE — PROGRESS NOTES
Pharmacy Clarification of the Prior to Admission Medication Regimen Retrospective to the Admission Medication Reconciliation The patient was interviewed regarding clarification of the prior to admission medication regimen. Mom was present in room and obtained permission from patient to discuss drug regimen with visitor(s) present. Patient was questioned regarding use of any other inhalers, topical products, over the counter medications, herbal medications, vitamin products or ophthalmic/nasal/otic medication use. Information Obtained From: Patient, personal med list, RX Query Recommendations/Findings: The following amendments were made to the patient's active medication list on file at [facility]:  
 
1) Additions:  
? All 7 medications below 2) Removals: NONE 3) Changes: NONE 4) Pertinent Pharmacy Findings: 
? Updated patients preferred outpatient pharmacy to: [facility] [address] AT [address] [place] [address] PTA medication list was corrected to the following:  
 
Prior to Admission Medications Prescriptions Last Dose Informant Patient Reported? Taking? amLODIPine (NORVASC) 10 mg tablet 2019 at Unknown time Self Yes Yes Sig: Take 10 mg by mouth daily. clopidogrel (PLAVIX) 75 mg tab 2019 at Unknown time Self Yes Yes Sig: Take 75 mg by mouth daily. fluticasone (FLONASE) 50 mcg/actuation nasal spray 2019 at Unknown time Self Yes Yes Si Sprays by Both Nostrils route two (2) times daily as needed for Rhinitis. ibuprofen (MOTRIN) 200 mg tablet 2/3/2019 at Unknown time Self Yes Yes Sig: Take 400 mg by mouth every six (6) hours as needed for Pain. irbesartan (AVAPRO) 300 mg tablet 2019 at Unknown time Self Yes Yes Sig: Take 300 mg by mouth daily. metoprolol succinate (TOPROL-XL) 50 mg XL tablet 2019 at Unknown time Self Yes Yes Sig: Take 50 mg by mouth daily. mv-min-C-glutamin-lysine-hb124 (A[place], LYSINE HCL,) 250-12.5 mg chew 2/3/2019 at Unknown time Self Yes Yes Sig: Take 1 Tab by mouth daily. Facility-Administered Medications: None Thank you, 
Mirella Barron, Billy Medication History Pharmacy Technician

## 2019-02-04 NOTE — PROGRESS NOTES
Bedside and Verbal shift change report given to [facility], RN (oncoming nurse) by Chary Simmons (offgoing nurse). Report included the following information SBAR, Kardex, ED Summary and Recent Results. Patient resting comfortably, side rails up, call bell w/in reach, no further needs expressed at this time, aware of POC. 
  
1850 Received a telephone verbal order with readback from Renata Bay MD for q6 PRN duo-nebs for wheezing. Called RT to administer a breathing treatment. 1920 Received a telephone verbal order with readback from Renata Bay MD for 15 ml of viscous lidocaine q6PRN and 650 mg of Tylenol q6PRN. Verbal shift change report given to EnCoate (oncoming nurse) by [facility], RN (offgoing nurse). Report included the following information SBAR, Kardex, Intake/Output, MAR and Recent Results.

## 2019-02-05 PROBLEM — N17.9 AKI (ACUTE KIDNEY INJURY) (HCC): Status: ACTIVE | Noted: 2019-02-05

## 2019-02-05 LAB
ANION GAP SERPL CALC-SCNC: 7 MMOL/L (ref 5–15)
BUN SERPL-MCNC: 31 MG/DL (ref 6–20)
BUN/CREAT SERPL: 16 (ref 12–20)
CALCIUM SERPL-MCNC: 7.8 MG/DL (ref 8.5–10.1)
CHLORIDE SERPL-SCNC: 113 MMOL/L (ref 97–108)
CO2 SERPL-SCNC: 22 MMOL/L (ref 21–32)
CREAT SERPL-MCNC: 1.99 MG/DL (ref 0.7–1.3)
GLUCOSE SERPL-MCNC: 121 MG/DL (ref 65–100)
MAGNESIUM SERPL-MCNC: 2.4 MG/DL (ref 1.6–2.4)
PHOSPHATE SERPL-MCNC: 4.4 MG/DL (ref 2.6–4.7)
POTASSIUM SERPL-SCNC: 4 MMOL/L (ref 3.5–5.1)
SODIUM SERPL-SCNC: 142 MMOL/L (ref 136–145)
TROPONIN I SERPL-MCNC: 0.05 NG/ML

## 2019-02-05 PROCEDURE — 96365 THER/PROPH/DIAG IV INF INIT: CPT

## 2019-02-05 PROCEDURE — 74011250637 HC RX REV CODE- 250/637: Performed by: GENERAL ACUTE CARE HOSPITAL

## 2019-02-05 PROCEDURE — 94760 N-INVAS EAR/PLS OXIMETRY 1: CPT

## 2019-02-05 PROCEDURE — 99218 HC RM OBSERVATION: CPT

## 2019-02-05 PROCEDURE — 74011000258 HC RX REV CODE- 258: Performed by: INTERNAL MEDICINE

## 2019-02-05 PROCEDURE — 74011250636 HC RX REV CODE- 250/636: Performed by: INTERNAL MEDICINE

## 2019-02-05 PROCEDURE — 96372 THER/PROPH/DIAG INJ SC/IM: CPT

## 2019-02-05 PROCEDURE — 36415 COLL VENOUS BLD VENIPUNCTURE: CPT

## 2019-02-05 PROCEDURE — 80048 BASIC METABOLIC PNL TOTAL CA: CPT

## 2019-02-05 PROCEDURE — 84484 ASSAY OF TROPONIN QUANT: CPT

## 2019-02-05 PROCEDURE — 74011250637 HC RX REV CODE- 250/637: Performed by: INTERNAL MEDICINE

## 2019-02-05 PROCEDURE — 84100 ASSAY OF PHOSPHORUS: CPT

## 2019-02-05 PROCEDURE — 96361 HYDRATE IV INFUSION ADD-ON: CPT

## 2019-02-05 PROCEDURE — 74011250636 HC RX REV CODE- 250/636: Performed by: GENERAL ACUTE CARE HOSPITAL

## 2019-02-05 PROCEDURE — 65660000000 HC RM CCU STEPDOWN

## 2019-02-05 PROCEDURE — 96375 TX/PRO/DX INJ NEW DRUG ADDON: CPT

## 2019-02-05 PROCEDURE — 83735 ASSAY OF MAGNESIUM: CPT

## 2019-02-05 RX ORDER — OXYMETAZOLINE HCL 0.05 %
2 SPRAY, NON-AEROSOL (ML) NASAL
Status: DISCONTINUED | OUTPATIENT
Start: 2019-02-05 | End: 2019-02-06 | Stop reason: HOSPADM

## 2019-02-05 RX ORDER — LORATADINE 10 MG/1
10 TABLET ORAL
Status: DISCONTINUED | OUTPATIENT
Start: 2019-02-05 | End: 2019-02-06 | Stop reason: HOSPADM

## 2019-02-05 RX ADMIN — CLOPIDOGREL BISULFATE 75 MG: 75 TABLET ORAL at 09:35

## 2019-02-05 RX ADMIN — Medication 100 MG: at 09:35

## 2019-02-05 RX ADMIN — HEPARIN SODIUM 5000 UNITS: 5000 INJECTION INTRAVENOUS; SUBCUTANEOUS at 15:34

## 2019-02-05 RX ADMIN — Medication 5 ML: at 06:19

## 2019-02-05 RX ADMIN — HYDRALAZINE HYDROCHLORIDE 10 MG: 20 INJECTION INTRAMUSCULAR; INTRAVENOUS at 15:35

## 2019-02-05 RX ADMIN — FOLIC ACID 1 MG: 1 TABLET ORAL at 09:36

## 2019-02-05 RX ADMIN — CEFTRIAXONE SODIUM 1 G: 1 INJECTION, POWDER, FOR SOLUTION INTRAMUSCULAR; INTRAVENOUS at 14:41

## 2019-02-05 RX ADMIN — ACETAMINOPHEN 650 MG: 325 TABLET ORAL at 20:13

## 2019-02-05 RX ADMIN — HEPARIN SODIUM 5000 UNITS: 5000 INJECTION INTRAVENOUS; SUBCUTANEOUS at 09:35

## 2019-02-05 RX ADMIN — MULTIPLE VITAMINS W/ MINERALS TAB 1 TABLET: TAB at 09:35

## 2019-02-05 RX ADMIN — Medication 10 ML: at 14:42

## 2019-02-05 RX ADMIN — SODIUM CHLORIDE 75 ML/HR: 900 INJECTION, SOLUTION INTRAVENOUS at 06:19

## 2019-02-05 RX ADMIN — Medication 2 SPRAY: at 18:30

## 2019-02-05 RX ADMIN — LORATADINE 10 MG: 10 TABLET ORAL at 14:51

## 2019-02-05 NOTE — PROGRESS NOTES
NAME: Serg Smart :  1965 MRN:  [id number] Assessment :    Plan: 
--MARYJO 
proteinuria Hypotension Volume depletion H/o CAD-Dr. Malcom Booker 
  --Creatinine much better. Suspect MARYJO was due to decreased volume. Mild proteinuria. May have some CKD as well. OK for D/C from my point of view. I'll be happy to see him as an outpatient. Subjective: Chief Complaint:  \" I feel much better. Can I go home today? \"  Hungry. No N/V. No dyspnea. No dizzyness. Review of Systems: 
 
Symptom Y/N Comments  Symptom Y/N Comments Fever/Chills    Chest Pain Poor Appetite    Edema Cough    Abdominal Pain Sputum    Joint Pain SOB/GONZALEZ    Pruritis/Rash Nausea/vomit    Tolerating PT/OT Diarrhea    Tolerating Diet Constipation    Other Could not obtain due to:   
 
Objective: VITALS:  
Last 24hrs VS reviewed since prior progress note. Most recent are: 
Visit Vitals /82 (BP 1 Location: Left arm, BP Patient Position: At rest) Pulse 66 Temp 99 °F (37.2 °C) Resp 16 Ht 5' 8\" (1.727 m) Wt 115.7 kg (255 lb) SpO2 92% BMI 38.77 kg/m² Intake/Output Summary (Last 24 hours) at 2019 7161 Last data filed at 2019 6823 Gross per 24 hour Intake 1230 ml Output 600 ml Net 630 ml Telemetry Reviewed: PHYSICAL EXAM: 
General: NAd No edema Lab Data Reviewed: (see below) Medications Reviewed: (see below) PMH/SH reviewed - no change compared to H&P 
________________________________________________________________________ Care Plan discussed with: 
Patient Family RN Care Manager Consultant:     
 
  Comments >50% of visit spent in counseling and coordination of care    
 
________________________________________________________________________ Jesse Terry MD  
 
 Procedures: see electronic medical records for all procedures/Xrays and details which 
were not copied into this note but were reviewed prior to creation of Plan. LABS: 
Recent Labs 02/04/19 
1119 WBC 8.6 HGB 16.8 HCT 48.2 * Recent Labs 02/05/19 
7810 02/04/19 
1119  140  
K 4.0 4.0  
* 106 CO2 22 24 BUN 31* 35* CREA 1.99* 3.80* * 142* CA 7.8* 8.0*  
MG 2.4  --   
PHOS 4.4  --   
 
Recent Labs 02/04/19 
1119 SGOT 33 AP 68  
TP 7.4 ALB 3.2*  
GLOB 4.2* No results for input(s): INR, PTP, APTT in the last 72 hours. No lab exists for component: INREXT No results for input(s): FE, TIBC, PSAT, FERR in the last 72 hours. No results found for: FOL, RBCF No results for input(s): PH, PCO2, PO2 in the last 72 hours. Recent Labs 02/04/19 
1119 * No components found for: [place] Lab Results Component Value Date/Time Color DARK YELLOW 02/04/2019 01:00 PM  
 Appearance CLOUDY (A) 02/04/2019 01:00 PM  
 Specific gravity 1.027 02/04/2019 01:00 PM  
 pH (UA) 5.0 02/04/2019 01:00 PM  
 Protein 100 (A) 02/04/2019 01:00 PM  
 Glucose NEGATIVE  02/04/2019 01:00 PM  
 Ketone TRACE (A) 02/04/2019 01:00 PM  
 Urobilinogen 1.0 02/04/2019 01:00 PM  
 Nitrites NEGATIVE  02/04/2019 01:00 PM  
 Leukocyte Esterase SMALL (A) 02/04/2019 01:00 PM  
 Epithelial cells FEW 02/04/2019 01:00 PM  
 Bacteria 2+ (A) 02/04/2019 01:00 PM  
 WBC 10-20 02/04/2019 01:00 PM  
 RBC 0-5 02/04/2019 01:00 PM  
 
 
MEDICATIONS: 
Current Facility-Administered Medications Medication Dose Route Frequency  sodium chloride (NS) flush 5-40 mL  5-40 mL IntraVENous Q8H  
 sodium chloride (NS) flush 5-40 mL  5-40 mL IntraVENous PRN  
 0.9% sodium chloride infusion  75 mL/hr IntraVENous CONTINUOUS  
 ondansetron (ZOFRAN) injection 4 mg  4 mg IntraVENous Q4H PRN  
 heparin (porcine) injection 5,000 Units  5,000 Units SubCUTAneous Q8H  
  LORazepam (ATIVAN) injection 2 mg  2 mg IntraVENous Q1H PRN  
 LORazepam (ATIVAN) injection 4 mg  4 mg IntraVENous E3A PRN  
 folic acid (FOLVITE) tablet 1 mg  1 mg Oral DAILY  thiamine mononitrate (B-1) tablet 100 mg  100 mg Oral DAILY  multivitamin, tx-iron-ca-min (THERA-M w/ IRON) tablet 1 Tab  1 Tab Oral DAILY  hydrALAZINE (APRESOLINE) 20 mg/mL injection 10 mg  10 mg IntraVENous Q6H PRN  
 clopidogrel (PLAVIX) tablet 75 mg  75 mg Oral DAILY  albuterol-ipratropium (DUO-NEB) 2.5 MG-0.5 MG/3 ML  3 mL Nebulization Q6H PRN  
 acetaminophen (TYLENOL) tablet 650 mg  650 mg Oral Q6H PRN  
 lidocaine (XYLOCAINE) 2 % viscous solution 15 mL  15 mL Mouth/Throat Q6H PRN

## 2019-02-05 NOTE — PROGRESS NOTES
Dr Lester Lilly aware of not being able to get troponin level at 1600, ordered to hold this at this time

## 2019-02-05 NOTE — PROGRESS NOTES
ADULT PROTOCOL: JET AEROSOL ASSESSMENT Patient  Serg Bing     48 y.o.   male     2/4/2019  11:13 PM 
 
Breath Sounds Pre Procedure: Right Breath Sounds: Diminished Left Breath Sounds: Diminished Breath Sounds Post Procedure: Right Breath Sounds: Diminished Left Breath Sounds: Diminished Breathing pattern: Pre procedure Breathing Pattern: Regular Post procedure Breathing Pattern: Regular Heart Rate: Pre procedure Pulse: 71 
         Post procedure Pulse: 79 Resp Rate: Pre procedure Respirations: 18 Post procedure Respirations: 18 
 
 
Cough: Pre procedure Cough: Non-productive Post procedure Cough: Non-productive Suctioned: NO Oxygen: O2 Device: Room air Changed: NO SpO2: Pre procedure SpO2: 94 %   without oxygen Post procedure SpO2: 95 %  without oxygen Nebulizer Therapy: Current medications Aerosolized Medications: DuoNeb Changed: NO Smoking History: smoker Problem List:  
Patient Active Problem List  
Diagnosis Code  [facility] N17.9 Respiratory Therapist: Artie Leonard RT

## 2019-02-05 NOTE — PROGRESS NOTES
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 
UA is abnormal 
Cont hydration, hold nephrotoxic medications (motrin and avapro) Check bmp in am 
Renal US is normal except for cystys Renal signed off and will see pt for out pt follow up Change to in pt Gram negative UTI Cont empiric ceftriaxone and follow urine cx results Near syncope likely due to dehydration 
telemetry monitoring. Check echo, orthostatic vitals. Troponin is mild elevated at 0.09 likely due to [facility], but will check 2 more sets EKG shows NSR 
 
 HTN Blood pressure is now controlled with out home meds. Monitor for now. Hold Norvasc 10mg,  Avapro 300mg, and Toprol XL Will restart meds if blood pressure goes up ? CAD 
-Pt unclear about his cardiac history - does take Plavix however. EtOH Abuse with concern for alcohol withdrawl 
-Pt states that his last drink was on Wednesday or Thursday 
-cont [facility] protocol 
-MVT/FA/Thiamine 
  
Code Status: Full Surrogate Decision Maker: Mother DVT Prophylaxis: Heparin GI Prophylaxis: not indicated Baseline: Independent Body mass index is 38.77 kg/m². Subjective: Chief Complaint / Reason for Physician Visit N/V are better. No abdominal pain. No diarrhea. Review of Systems: 
Symptom Y/N Comments  Symptom Y/N Comments Fever/Chills    Chest Pain Poor Appetite    Edema Cough    Abdominal Pain Sputum    Joint Pain SOB/GONZALEZ    Pruritis/Rash Nausea/vomit n   Tolerating PT/OT Diarrhea    Tolerating Diet Constipation    Other Could NOT obtain due to:   
 
Objective: VITALS:  
Last 24hrs VS reviewed since prior progress note. Most recent are: 
Patient Vitals for the past 24 hrs: 
 Temp Pulse Resp BP SpO2  
19 1052  85  126/83  02/05/19 1051  69  (!) 135/92   
02/05/19 1046 98.9 °F (37.2 °C) 60 16 (!) 145/97 97 % 02/05/19 0715 99 °F (37.2 °C) 66 16 138/82 92 % 02/05/19 0014 98.5 °F (36.9 °C) 69 18 132/65 95 % 02/04/19 1919     94 % 02/04/19 1449 98.5 °F (36.9 °C) 64 18 127/87 96 % Intake/Output Summary (Last 24 hours) at 2/5/2019 1421 Last data filed at 2/5/2019 1124 Gross per 24 hour Intake 1230 ml Output 1000 ml Net 230 ml PHYSICAL EXAM: 
General: cooperative, no acute distress   
EENT:  EOMI. Anicteric sclerae. MMM Resp:  CTA bilaterally, no wheezing or rales. No accessory muscle use CV:  Regular  rhythm,  No edema GI:  Soft, Non distended, Non tender.  +Bowel sounds Neurologic:  Alert and oriented X 3, normal speech, Psych:   Good insight. Not anxious nor agitated Skin:  No rashes. No jaundice Reviewed most current lab test results and cultures  YES Reviewed most current radiology test results   YES Review and summation of old records today    NO Reviewed patient's current orders and MAR    YES 
PMH/SH reviewed - no change compared to H&P Current Facility-Administered Medications:  
  cefTRIAXone (ROCEPHIN) 1 g in 0.9% sodium chloride (MBP/ADV) 50 mL, 1 g, IntraVENous, Q24H, Magdi Matson MD 
  sodium chloride (NS) flush 5-40 mL, 5-40 mL, IntraVENous, Q8H, Jose Mcdonald MD, 5 mL at 02/05/19 8978 
  sodium chloride (NS) flush 5-40 mL, 5-40 mL, IntraVENous, PRN, Nolan Peters MD 
  0.9% sodium chloride infusion, 75 mL/hr, IntraVENous, CONTINUOUS, Nolan Peters MD, Last Rate: 75 mL/hr at 02/05/19 0619, 75 mL/hr at 02/05/19 0619 
  ondansetron (ZOFRAN) injection 4 mg, 4 mg, IntraVENous, Q4H PRN, Nolan Peters MD 
  heparin (porcine) injection 5,000 Units, 5,000 Units, SubCUTAneous, Q8H, Nolan Peters MD, 5,000 Units at 02/05/19 9486   LORazepam (ATIVAN) injection 2 mg, 2 mg, IntraVENous, Q1H PRN, Nolna Peters MD 
   LORazepam (ATIVAN) injection 4 mg, 4 mg, IntraVENous, Q1H PRN, Tyler Bain MD 
  folic acid (FOLVITE) tablet 1 mg, 1 mg, Oral, DAILY, Tyler Bain MD, 1 mg at 02/05/19 6022   thiamine mononitrate (B-1) tablet 100 mg, 100 mg, Oral, DAILY, Tyler Bain MD, 100 mg at 02/05/19 9709   multivitamin, tx-iron-ca-min (THERA-M w/ IRON) tablet 1 Tab, 1 Tab, Oral, DAILY, Tyler Bain MD, 1 Tab at 02/05/19 0912   hydrALAZINE (APRESOLINE) 20 mg/mL injection 10 mg, 10 mg, IntraVENous, Q6H PRN, Tyler Bain MD 
  clopidogrel (PLAVIX) tablet 75 mg, 75 mg, Oral, DAILY, Tyler Bain MD, 75 mg at 02/05/19 1518   albuterol-ipratropium (DUO-NEB) 2.5 MG-0.5 MG/3 ML, 3 mL, Nebulization, Q6H PRN, Tyler Bain MD, 3 mL at 02/04/19 1921   acetaminophen (TYLENOL) tablet 650 mg, 650 mg, Oral, Q6H PRN, Tyler Bain MD, 650 mg at 02/04/19 2908   lidocaine (XYLOCAINE) 2 % viscous solution 15 mL, 15 mL, Mouth/Throat, Q6H PRN, Tyler Bain MD 
________________________________________________________________________ Care Plan discussed with: 
  Comments Patient y Family RN y   
Care Manager Consultant Multidiciplinary team rounds were held today with , nursing, pharmacist and clinical coordinator. Patient's plan of care was discussed; medications were reviewed and discharge planning was addressed. ________________________________________________________________________ Total NON critical care TIME:  35   Minutes Total CRITICAL CARE TIME Spent:   Minutes non procedure based Comments >50% of visit spent in counseling and coordination of care    
________________________________________________________________________ Halima Ulrich MD  
 
Procedures: see electronic medical records for all procedures/Xrays and details which were not copied into this note but were reviewed prior to creation of Plan.    
 
LABS: 
 I reviewed today's most current labs and imaging studies. Pertinent labs include: 
Recent Labs 02/04/19 
1119 WBC 8.6 HGB 16.8 HCT 48.2 * Recent Labs 02/05/19 
7716 02/04/19 
1119  140  
K 4.0 4.0  
* 106 CO2 22 24 * 142* BUN 31* 35* CREA 1.99* 3.80* CA 7.8* 8.0*  
MG 2.4  --   
PHOS 4.4  --   
ALB  --  3.2* TBILI  --  0.5 SGOT  --  33 ALT  --  33 Signed: Lukasz Jiang MD

## 2019-02-06 ENCOUNTER — APPOINTMENT (OUTPATIENT)
Dept: NON INVASIVE DIAGNOSTICS | Age: 54
DRG: 683 | End: 2019-02-06
Attending: INTERNAL MEDICINE
Payer: COMMERCIAL

## 2019-02-06 VITALS
RESPIRATION RATE: 20 BRPM | BODY MASS INDEX: 38.65 KG/M2 | HEART RATE: 81 BPM | SYSTOLIC BLOOD PRESSURE: 155 MMHG | OXYGEN SATURATION: 96 % | DIASTOLIC BLOOD PRESSURE: 104 MMHG | WEIGHT: 255 LBS | TEMPERATURE: 99.1 F | HEIGHT: 68 IN

## 2019-02-06 LAB
ANION GAP SERPL CALC-SCNC: 9 MMOL/L (ref 5–15)
BACTERIA SPEC CULT: ABNORMAL
BASOPHILS # BLD: 0 K/UL (ref 0–0.1)
BASOPHILS NFR BLD: 0 % (ref 0–1)
BUN SERPL-MCNC: 18 MG/DL (ref 6–20)
BUN/CREAT SERPL: 14 (ref 12–20)
CALCIUM SERPL-MCNC: 7.6 MG/DL (ref 8.5–10.1)
CC UR VC: ABNORMAL
CHLORIDE SERPL-SCNC: 113 MMOL/L (ref 97–108)
CO2 SERPL-SCNC: 21 MMOL/L (ref 21–32)
COMMENT, HOLDF: NORMAL
CREAT SERPL-MCNC: 1.33 MG/DL (ref 0.7–1.3)
DIFFERENTIAL METHOD BLD: ABNORMAL
ECHO AO ROOT DIAM: 3.65 CM
ECHO AV AREA PEAK VELOCITY: 2.4 CM2
ECHO AV AREA/BSA PEAK VELOCITY: 1 CM2/M2
ECHO AV CUSP MM: 1.94 CM
ECHO AV PEAK GRADIENT: 8.7 MMHG
ECHO AV PEAK VELOCITY: 147.88 CM/S
ECHO LA AREA 2C: 18.12 CM2
ECHO LA AREA 4C: 14 CM2
ECHO LA MAJOR AXIS: 3.31 CM
ECHO LA TO AORTIC ROOT RATIO: 0.91
ECHO LA VOL 2C: 50.23 ML (ref 18–58)
ECHO LA VOL 4C: 30.09 ML (ref 18–58)
ECHO LA VOL BP: 40.1 ML (ref 18–58)
ECHO LA VOL/BSA BIPLANE: 17.69 ML/M2 (ref 16–28)
ECHO LA VOLUME INDEX A2C: 22.16 ML/M2 (ref 16–28)
ECHO LA VOLUME INDEX A4C: 13.28 ML/M2 (ref 16–28)
ECHO LV INTERNAL DIMENSION DIASTOLIC: 4.31 CM (ref 4.2–5.9)
ECHO LV INTERNAL DIMENSION SYSTOLIC: 3.16 CM
ECHO LV IVSD: 1.46 CM (ref 0.6–1)
ECHO LV IVSS: 1.93 CM
ECHO LV MASS 2D: 296 G (ref 88–224)
ECHO LV MASS INDEX 2D: 130.6 G/M2 (ref 49–115)
ECHO LV POSTERIOR WALL DIASTOLIC: 1.46 CM (ref 0.6–1)
ECHO LV POSTERIOR WALL SYSTOLIC: 1.81 CM
ECHO LVOT DIAM: 2.37 CM
ECHO LVOT PEAK GRADIENT: 2.6 MMHG
ECHO LVOT PEAK VELOCITY: 80.13 CM/S
ECHO MV A VELOCITY: 86.41 CM/S
ECHO MV AREA PHT: 3.2 CM2
ECHO MV E DECELERATION TIME (DT): 239 MS
ECHO MV E VELOCITY: 0.63 CM/S
ECHO MV E/A RATIO: 0.01
ECHO MV PRESSURE HALF TIME (PHT): 69.3 MS
ECHO PV MAX VELOCITY: 77.02 CM/S
ECHO PV PEAK GRADIENT: 2.4 MMHG
ECHO TV REGURGITANT MAX VELOCITY: 125.01 CM/S
ECHO TV REGURGITANT PEAK GRADIENT: 6.3 MMHG
EOSINOPHIL # BLD: 0 K/UL (ref 0–0.4)
EOSINOPHIL NFR BLD: 0 % (ref 0–7)
ERYTHROCYTE [DISTWIDTH] IN BLOOD BY AUTOMATED COUNT: 13.3 % (ref 11.5–14.5)
GLUCOSE SERPL-MCNC: 112 MG/DL (ref 65–100)
HCT VFR BLD AUTO: 41.8 % (ref 36.6–50.3)
HGB BLD-MCNC: 14.5 G/DL (ref 12.1–17)
IMM GRANULOCYTES # BLD AUTO: 0 K/UL (ref 0–0.04)
IMM GRANULOCYTES NFR BLD AUTO: 0 % (ref 0–0.5)
LYMPHOCYTES # BLD: 1.5 K/UL (ref 0.8–3.5)
LYMPHOCYTES NFR BLD: 19 % (ref 12–49)
MCH RBC QN AUTO: 31.8 PG (ref 26–34)
MCHC RBC AUTO-ENTMCNC: 34.7 G/DL (ref 30–36.5)
MCV RBC AUTO: 91.7 FL (ref 80–99)
MONOCYTES # BLD: 0.8 K/UL (ref 0–1)
MONOCYTES NFR BLD: 10 % (ref 5–13)
NEUTS SEG # BLD: 5.5 K/UL (ref 1.8–8)
NEUTS SEG NFR BLD: 70 % (ref 32–75)
NRBC # BLD: 0 K/UL (ref 0–0.01)
NRBC BLD-RTO: 0 PER 100 WBC
PLATELET # BLD AUTO: 98 K/UL (ref 150–400)
PMV BLD AUTO: 10.6 FL (ref 8.9–12.9)
POTASSIUM SERPL-SCNC: 4.3 MMOL/L (ref 3.5–5.1)
RBC # BLD AUTO: 4.56 M/UL (ref 4.1–5.7)
SAMPLES BEING HELD,HOLD: NORMAL
SERVICE CMNT-IMP: ABNORMAL
SODIUM SERPL-SCNC: 143 MMOL/L (ref 136–145)
TROPONIN I SERPL-MCNC: 0.05 NG/ML
WBC # BLD AUTO: 7.8 K/UL (ref 4.1–11.1)

## 2019-02-06 PROCEDURE — 74011250636 HC RX REV CODE- 250/636: Performed by: INTERNAL MEDICINE

## 2019-02-06 PROCEDURE — 36415 COLL VENOUS BLD VENIPUNCTURE: CPT

## 2019-02-06 PROCEDURE — 93306 TTE W/DOPPLER COMPLETE: CPT

## 2019-02-06 PROCEDURE — 99218 HC RM OBSERVATION: CPT

## 2019-02-06 PROCEDURE — 74011000258 HC RX REV CODE- 258: Performed by: INTERNAL MEDICINE

## 2019-02-06 PROCEDURE — 80048 BASIC METABOLIC PNL TOTAL CA: CPT

## 2019-02-06 PROCEDURE — 74011250637 HC RX REV CODE- 250/637: Performed by: INTERNAL MEDICINE

## 2019-02-06 PROCEDURE — 74011250637 HC RX REV CODE- 250/637: Performed by: GENERAL ACUTE CARE HOSPITAL

## 2019-02-06 PROCEDURE — 85025 COMPLETE CBC W/AUTO DIFF WBC: CPT

## 2019-02-06 PROCEDURE — 84484 ASSAY OF TROPONIN QUANT: CPT

## 2019-02-06 PROCEDURE — 74011250636 HC RX REV CODE- 250/636: Performed by: GENERAL ACUTE CARE HOSPITAL

## 2019-02-06 PROCEDURE — 96366 THER/PROPH/DIAG IV INF ADDON: CPT

## 2019-02-06 RX ORDER — METOPROLOL SUCCINATE 50 MG/1
50 TABLET, EXTENDED RELEASE ORAL DAILY
Status: DISCONTINUED | OUTPATIENT
Start: 2019-02-06 | End: 2019-02-06 | Stop reason: HOSPADM

## 2019-02-06 RX ORDER — AMLODIPINE BESYLATE 5 MG/1
10 TABLET ORAL DAILY
Status: DISCONTINUED | OUTPATIENT
Start: 2019-02-06 | End: 2019-02-06 | Stop reason: HOSPADM

## 2019-02-06 RX ORDER — CEFUROXIME AXETIL 250 MG/1
250 TABLET ORAL 2 TIMES DAILY
Qty: 6 TAB | Refills: 0 | Status: SHIPPED | OUTPATIENT
Start: 2019-02-06 | End: 2019-02-09

## 2019-02-06 RX ADMIN — SODIUM CHLORIDE 75 ML/HR: 900 INJECTION, SOLUTION INTRAVENOUS at 00:21

## 2019-02-06 RX ADMIN — Medication 100 MG: at 09:19

## 2019-02-06 RX ADMIN — AMLODIPINE BESYLATE 10 MG: 5 TABLET ORAL at 11:19

## 2019-02-06 RX ADMIN — FOLIC ACID 1 MG: 1 TABLET ORAL at 09:18

## 2019-02-06 RX ADMIN — METOPROLOL SUCCINATE 50 MG: 50 TABLET, EXTENDED RELEASE ORAL at 11:19

## 2019-02-06 RX ADMIN — MULTIPLE VITAMINS W/ MINERALS TAB 1 TABLET: TAB at 09:19

## 2019-02-06 RX ADMIN — Medication 5 ML: at 00:21

## 2019-02-06 RX ADMIN — CLOPIDOGREL BISULFATE 75 MG: 75 TABLET ORAL at 09:19

## 2019-02-06 RX ADMIN — CEFTRIAXONE SODIUM 1 G: 1 INJECTION, POWDER, FOR SOLUTION INTRAMUSCULAR; INTRAVENOUS at 11:17

## 2019-02-06 NOTE — PROGRESS NOTES
Reason for Admission:   Pt was admitted on 2/5/19 d/t a Dx of MARYJO 
                
RRAT Score:          3 Plan for utilizing home health:      Not ordered Likelihood of Readmission:    LOW Transition of Care Plan:                  Pt lives with mother in two story home. Pt has no experience with HH or SNF. Pt has no DME. Alert and oriented. I W ADLs and IADLs. Drives. 4:14 PM  
Pt left prior to CM Specialist making PCP appt. Appt was scheduled and CM called him and left VM on cell phone notifying him about PCP appt with Dr. Niru Trevino on 2/13/19. Pt has DC order. Mother transporting him home in car. No further CM needs. Care Management Interventions PCP Verified by CM: Yes 
Palliative Care Criteria Met (RRAT>21 & CHF Dx)?: No 
Mode of Transport at Discharge: Other (see comment) Transition of Care Consult (CM Consult): Discharge Planning MyChart Signup: No 
Discharge Durable Medical Equipment: No 
Physical Therapy Consult: No 
Occupational Therapy Consult: No 
Speech Therapy Consult: No 
Current Support Network: Relative's Home Confirm Follow Up Transport: Family Plan discussed with Pt/Family/Caregiver: Yes Freedom of Choice Offered: Yes Discharge Location Discharge Placement: Home with family assistance Anahi Mistry CM Ext [phone]

## 2019-02-06 NOTE — PROGRESS NOTES
4:05 PM Discharge instructions reviewed with khanh including f/u apts, medications, and signs/symptoms to report. Patient verbalized understanding. IV removed. Discharge instructions and prescriptions given to patient.

## 2019-02-06 NOTE — DISCHARGE INSTRUCTIONS
Bmp in 1 week with results faxed to Dr Nasima Cox. Avoid alcohol in future. Don't take Medications like Motrin, Aleeve.

## 2019-02-06 NOTE — PROGRESS NOTES
Oncology End of Shift Note Bedside shift change report given to ARCELIA johnson (incoming nurse) by Jessie Willis RN (outgoing nurse) on [facility]. Report included the following information SBAR. Shift Summary: tele,labs,meds,monitor vs 
 
 
Issues for Physician to Address:  Sinus meds Patient on Cardiac Monitoring? [x] Yes 
[] No 
 
Rhythm:   
 
 
 
Shift Events Walter Pet Jessie Willis RN

## 2019-02-06 NOTE — PROGRESS NOTES
Received report from [facility]. Day shift RN Myesha Campbell assumes care.

## 2019-02-06 NOTE — DISCHARGE SUMMARY
Hospitalist Discharge Summary     Patient ID:  Danny Barrera  [id number]  22 y.o.  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  male who presents with CC listed above. Pt states that since Friday he has been feeling unwell, and it was because he noticed a scratch in his throat. He states that he has been \"dealing with that all weekend\" and therefore he went to his PCP's office this morning. At his PCP's office, pt states that he started to feel \"hot and cold\" and also complained of dizziness. Pt is unclear whether he had a syncopal event or not. EMS was called and pt was brought to the hospital. Pt denies fever, chills, SOB or CP prior to, during or after the event. Pt admits to some nausea, but denies any vomiting or diarrhea. He does endorse 1 loose BM this morning. He does also endorse taking 1 tablet of Aleve this morning. Of note, he states that he is trying to quit binge drinking - with his last drink at some time on Wednesday or Thursday. Pt denies any withdrawal symptoms. Currently he is lying in bed without any complaints. We were asked to admit for work up and evaluation of the above problems. ______________________________________________________________________  DISCHARGE SUMMARY/HOSPITAL COURSE:  for full details see H&P, daily progress notes, labs, consult notes.      Patient was admitted to hospital and diagnosed to have acute kidney injury likely due to prerenal causes and also medications.  Creatinine peaked at 3.88.  Nephrotoxic medications were stopped and she was started on IV fluids with improvement of creatinine.  Renal ultrasound was within normal limits.  He was also noted to have urinary tract infection and urine culture grew Citrobacter sensitive to cefuroxime and he will received 5 days of antibiotics.  He was also noted to have near syncope likely due to dehydration for which he had complete workup including troponins which were normal, telemetry showed normal sinus rhythm.  Orthostatic vitals were negative.  Echocardiogram was within normal limits.  The rest of the medications were within normal limits.  He was also started on alcohol withdrawal for EtOH abuse but did not have any alcohol withdrawal symptoms.  The rest of the medical problems remained stable during hospitalization.  Today he seen and examined.  His vital signs are stable.  [facility] is being discharged in much improved condition for outpatient follow-up        _______________________________________________________________________  Patient seen and examined by me on discharge day. Pertinent Findings:  Gen:    Not in distress  Chest: Clear lungs  CVS:   Regular rhythm. No edema  Abd:  Soft, not distended, not tender  Neuro:  Alert, awake  _______________________________________________________________________  DISCHARGE MEDICATIONS:   Current Discharge Medication List      START taking these medications    Details   multivitamin, tx-iron-ca-min (THERA-M W/ IRON) 9 mg iron-400 mcg tab tablet Take 1 Tab by mouth daily for 30 days. Qty: 30 Tab, Refills: 0      cefUROXime (CEFTIN) 250 mg tablet Take 1 Tab by mouth two (2) times a day for 3 days. Qty: 6 Tab, Refills: 0         CONTINUE these medications which have NOT CHANGED    Details   amLODIPine (NORVASC) 10 mg tablet Take 10 mg by mouth daily. clopidogrel (PLAVIX) 75 mg tab Take 75 mg by mouth daily. metoprolol succinate (TOPROL-XL) 50 mg XL tablet Take 50 mg by mouth daily.       fluticasone (FLONASE) 50 mcg/actuation nasal spray 2 Sprays by Both Nostrils route two (2) times daily as needed for Rhinitis. mv-min-C-glutamin-lysine-hb124 (AIRBORNE, LYSINE HCL,) 250-12.5 mg chew Take 1 Tab by mouth daily. STOP taking these medications       ibuprofen (MOTRIN) 200 mg tablet Comments:   Reason for Stopping:         irbesartan (AVAPRO) 300 mg tablet Comments:   Reason for Stopping:               My Recommended Diet, Activity, Wound Care, and follow-up labs are listed in the patient's Discharge Insturctions which I have personally completed and reviewed.     _______________________________________________________________________  DISPOSITION:    Home with Family: y   Home with HH/PT/OT/RN:    SNF/LTC:    KIP:    OTHER:        Condition at Discharge:  Stable  _______________________________________________________________________  Follow up with:   PCP : Opal Adorno MD  Follow-up Information     Follow up With Specialties Details Why Contact Info    Opal Adorno MD    Patient can only remember the practice name and not the physician      Opal Adorno MD    Patient can only remember the practice name and not the physician      Boston Rueda MD Nephrology Schedule an appointment as soon as possible for a visit in 1 week  [address] [place] [id number]  [phone]                Total time in minutes spent coordinating this discharge (includes going over instructions, follow-up, prescriptions, and preparing report for sign off to her PCP) :  35  minutes    Signed:  Mirna Evans MD

## 2021-08-03 PROBLEM — N17.9 ACUTE KIDNEY INJURY (HCC): Status: RESOLVED | Noted: 2019-02-04 | Resolved: 2021-08-03

## 2022-03-18 PROBLEM — N17.9 AKI (ACUTE KIDNEY INJURY) (HCC): Status: ACTIVE | Noted: 2019-02-05

## 2022-07-12 ENCOUNTER — ANESTHESIA (OUTPATIENT)
Dept: ENDOSCOPY | Age: 57
End: 2022-07-12
Payer: COMMERCIAL

## 2022-07-12 ENCOUNTER — HOSPITAL ENCOUNTER (OUTPATIENT)
Age: 57
Setting detail: OUTPATIENT SURGERY
Discharge: HOME | End: 2022-07-12
Attending: INTERNAL MEDICINE | Admitting: INTERNAL MEDICINE
Payer: COMMERCIAL

## 2022-07-12 ENCOUNTER — ANESTHESIA EVENT (OUTPATIENT)
Dept: ENDOSCOPY | Age: 57
End: 2022-07-12
Payer: COMMERCIAL

## 2022-07-12 VITALS
DIASTOLIC BLOOD PRESSURE: 99 MMHG | HEART RATE: 62 BPM | BODY MASS INDEX: 36.25 KG/M2 | WEIGHT: 239.2 LBS | RESPIRATION RATE: 19 BRPM | HEIGHT: 68 IN | OXYGEN SATURATION: 93 % | SYSTOLIC BLOOD PRESSURE: 135 MMHG | TEMPERATURE: 97.8 F

## 2022-07-12 PROCEDURE — 74011000250 HC RX REV CODE- 250: Performed by: ANESTHESIOLOGY

## 2022-07-12 PROCEDURE — 88305 TISSUE EXAM BY PATHOLOGIST: CPT

## 2022-07-12 PROCEDURE — 74011250636 HC RX REV CODE- 250/636: Performed by: NURSE ANESTHETIST, CERTIFIED REGISTERED

## 2022-07-12 PROCEDURE — 76060000032 HC ANESTHESIA 0.5 TO 1 HR: Performed by: INTERNAL MEDICINE

## 2022-07-12 PROCEDURE — 77030010936 HC CLP LIG BSC -C: Performed by: INTERNAL MEDICINE

## 2022-07-12 PROCEDURE — 74011250636 HC RX REV CODE- 250/636: Performed by: ANESTHESIOLOGY

## 2022-07-12 PROCEDURE — 77030029384 HC SNR POLYP CAPTVR II BSC -B: Performed by: INTERNAL MEDICINE

## 2022-07-12 PROCEDURE — 76040000007: Performed by: INTERNAL MEDICINE

## 2022-07-12 PROCEDURE — 77030037345 HC NET FB ENDO RETVR RESCUNT DISP BSC -B: Performed by: INTERNAL MEDICINE

## 2022-07-12 DEVICE — WORKING LENGTH 235CM, WORKING CHANNEL 2.8MM
Type: IMPLANTABLE DEVICE | Site: TRANSVERSE COLON | Status: FUNCTIONAL
Brand: RESOLUTION 360 CLIP

## 2022-07-12 RX ORDER — SODIUM CHLORIDE 9 MG/ML
INJECTION, SOLUTION INTRAVENOUS
Status: DISCONTINUED | OUTPATIENT
Start: 2022-07-12 | End: 2022-07-12 | Stop reason: HOSPADM

## 2022-07-12 RX ORDER — SODIUM CHLORIDE 0.9 % (FLUSH) 0.9 %
5-40 SYRINGE (ML) INJECTION AS NEEDED
Status: DISCONTINUED | OUTPATIENT
Start: 2022-07-12 | End: 2022-07-12 | Stop reason: HOSPADM

## 2022-07-12 RX ORDER — FLUMAZENIL 0.1 MG/ML
0.2 INJECTION INTRAVENOUS
Status: DISCONTINUED | OUTPATIENT
Start: 2022-07-12 | End: 2022-07-12 | Stop reason: HOSPADM

## 2022-07-12 RX ORDER — ATROPINE SULFATE 0.1 MG/ML
0.5 INJECTION INTRAVENOUS
Status: DISCONTINUED | OUTPATIENT
Start: 2022-07-12 | End: 2022-07-12 | Stop reason: HOSPADM

## 2022-07-12 RX ORDER — SODIUM CHLORIDE 0.9 % (FLUSH) 0.9 %
5-40 SYRINGE (ML) INJECTION EVERY 8 HOURS
Status: DISCONTINUED | OUTPATIENT
Start: 2022-07-12 | End: 2022-07-12 | Stop reason: HOSPADM

## 2022-07-12 RX ORDER — EPINEPHRINE 0.1 MG/ML
1 INJECTION INTRACARDIAC; INTRAVENOUS
Status: DISCONTINUED | OUTPATIENT
Start: 2022-07-12 | End: 2022-07-12 | Stop reason: HOSPADM

## 2022-07-12 RX ORDER — PROPOFOL 10 MG/ML
INJECTION, EMULSION INTRAVENOUS AS NEEDED
Status: DISCONTINUED | OUTPATIENT
Start: 2022-07-12 | End: 2022-07-12 | Stop reason: HOSPADM

## 2022-07-12 RX ORDER — ATORVASTATIN CALCIUM 80 MG/1
80 TABLET, FILM COATED ORAL DAILY
COMMUNITY

## 2022-07-12 RX ORDER — LIDOCAINE HYDROCHLORIDE 20 MG/ML
INJECTION, SOLUTION EPIDURAL; INFILTRATION; INTRACAUDAL; PERINEURAL AS NEEDED
Status: DISCONTINUED | OUTPATIENT
Start: 2022-07-12 | End: 2022-07-12 | Stop reason: HOSPADM

## 2022-07-12 RX ORDER — NALOXONE HYDROCHLORIDE 0.4 MG/ML
0.4 INJECTION, SOLUTION INTRAMUSCULAR; INTRAVENOUS; SUBCUTANEOUS
Status: DISCONTINUED | OUTPATIENT
Start: 2022-07-12 | End: 2022-07-12 | Stop reason: HOSPADM

## 2022-07-12 RX ORDER — SODIUM CHLORIDE 9 MG/ML
50 INJECTION, SOLUTION INTRAVENOUS CONTINUOUS
Status: DISCONTINUED | OUTPATIENT
Start: 2022-07-12 | End: 2022-07-12 | Stop reason: HOSPADM

## 2022-07-12 RX ORDER — CLONIDINE HYDROCHLORIDE 0.2 MG/1
TABLET ORAL 2 TIMES DAILY
COMMUNITY

## 2022-07-12 RX ORDER — DEXTROMETHORPHAN/PSEUDOEPHED 2.5-7.5/.8
1.2 DROPS ORAL
Status: DISCONTINUED | OUTPATIENT
Start: 2022-07-12 | End: 2022-07-12 | Stop reason: HOSPADM

## 2022-07-12 RX ORDER — CARVEDILOL 6.25 MG/1
TABLET ORAL 2 TIMES DAILY WITH MEALS
COMMUNITY

## 2022-07-12 RX ADMIN — PROPOFOL 30 MG: 10 INJECTION, EMULSION INTRAVENOUS at 13:54

## 2022-07-12 RX ADMIN — LIDOCAINE HYDROCHLORIDE 40 MG: 20 INJECTION, SOLUTION EPIDURAL; INFILTRATION; INTRACAUDAL; PERINEURAL at 13:24

## 2022-07-12 RX ADMIN — PROPOFOL 50 MG: 10 INJECTION, EMULSION INTRAVENOUS at 13:31

## 2022-07-12 RX ADMIN — PROPOFOL 40 MG: 10 INJECTION, EMULSION INTRAVENOUS at 13:59

## 2022-07-12 RX ADMIN — LIDOCAINE HYDROCHLORIDE 40 MG: 20 INJECTION, SOLUTION EPIDURAL; INFILTRATION; INTRACAUDAL; PERINEURAL at 13:28

## 2022-07-12 RX ADMIN — PROPOFOL 30 MG: 10 INJECTION, EMULSION INTRAVENOUS at 13:48

## 2022-07-12 RX ADMIN — SODIUM CHLORIDE: 900 INJECTION, SOLUTION INTRAVENOUS at 13:16

## 2022-07-12 RX ADMIN — PROPOFOL 50 MG: 10 INJECTION, EMULSION INTRAVENOUS at 13:35

## 2022-07-12 RX ADMIN — PROPOFOL 50 MG: 10 INJECTION, EMULSION INTRAVENOUS at 13:44

## 2022-07-12 RX ADMIN — PROPOFOL 50 MG: 10 INJECTION, EMULSION INTRAVENOUS at 13:39

## 2022-07-12 RX ADMIN — PROPOFOL 100 MG: 10 INJECTION, EMULSION INTRAVENOUS at 13:28

## 2022-07-12 NOTE — DISCHARGE INSTRUCTIONS
[address] [address] SURYA Wallace MD  [phone]      July 12, 2022    Rudy Bauer  YOB: 1965    COLONOSCOPY DISCHARGE INSTRUCTIONS    If there is redness at IV site you should apply warm compress to area. If redness or soreness persist contact Dr. Aviva Wallace or your primary care doctor. There may be a slight amount of blood passed from the rectum. Gaseous discomfort may develop, but walking, belching will help relieve this. You may not operate a vehicle for 12 hours  You may not operate machinery or dangerous appliances for rest of today  You may not drink alcoholic beverages for 12 hours  Avoid making any critical decisions for 24 hours    DIET:  You may resume your normal diet, but some patients find that heavy or large meals may lead to indigestion or vomiting. I suggest a light meal as first food intake. MEDICATIONS:  The use of some over-the-counter pain medication may lead to bleeding after colon biopsies or polyp removal.  Tylenol (also called acetaminophen) is safe to take even if you have had colonoscopy with polyp removal.  Based on the procedure you can resume baby-dose aspirin (81 mg) and may not safely take anticoagulation (like apixaban (Eliquis), dabigatran (Pradaxa), edoxaban (Leighann Tomlinson), rivaroxaban (Xarelto) or warfarin (Coumadin)) or antiplatelet medications (high dose aspirin 325mg, Clopidogrel (Plavix), Prasugrel (Effient), Ticagrelor (Brilinta))for the next two (48 hours) days. ACTIVITY:  You may resume your normal household activities, but it is recommended that you spend the remainder of the day resting -  avoid any strenuous activity. CALL DR. CAVAZOS'S OFFICE IF:  Increasing pain, nausea, vomiting  Abdominal distension (swelling)  Significant new or increased bleeding (oral or rectal)  Fever/Chills  Chest pain/shortness of breath                       Additional instructions:   Five polyps identified and removed today. One polyp was large in size, and due to this we recommend repeat colonoscopy in 3 years or sooner if there are microscopic findings suggesting that some residual polyp remains at polypectomy site of the large polyp. We will send you a letter regarding your results or contact you by phone if there are any concerning findings. It was an honor to be your doctor today. Please let me or my office staff know if you have any feedback about today's procedure. Lonny Tidwell MD, July 12, 2022    Colonoscopy saves lives, and can prevent colon cancer. Everyone aged 48 or older needs colonoscopy.   Tell your family and friends: get the test!

## 2022-07-12 NOTE — ANESTHESIA PREPROCEDURE EVALUATION
Relevant Problems   RENAL FAILURE   (+) MARYJO (acute kidney injury) ([facility] [address]       Anesthetic History   No history of anesthetic complications            Review of Systems / Medical History  Patient summary reviewed, nursing notes reviewed and pertinent labs reviewed    Pulmonary  Within defined limits                 Neuro/Psych       CVA       Cardiovascular    Hypertension                   GI/Hepatic/Renal  Within defined limits              Endo/Other  Within defined limits           Other Findings              Physical Exam    Airway  Mallampati: II  TM Distance: > 6 cm  Neck ROM: normal range of motion   Mouth opening: Normal     Cardiovascular  Regular rate and rhythm,  S1 and S2 normal,  no murmur, click, rub, or gallop             Dental  No notable dental hx       Pulmonary  Breath sounds clear to auscultation               Abdominal  GI exam deferred       Other Findings            Anesthetic Plan    ASA: 3  Anesthesia type: MAC            Anesthetic plan and risks discussed with: Patient

## 2022-07-12 NOTE — H&P
Pre-Endoscopy H&P   Chief complaint: screening or surveillance of previous colon polyps    HPI:  Patient presents for procedure. The indication for the procedure, the patient's history and the patient's current medications are reviewed prior to the procedure and that data is reported on the endoscopy note in the chart to which this document is attached. Any significant complaints with regard to organ systems will be noted, and if not mentioned then a review of systems is not contributory. Past Medical History:   Diagnosis Date    Arthritis     Hypertension     Stroke [facility])       Past Surgical History:   Procedure Laterality Date    HX ORTHOPAEDIC  2014    left hip replaced     Social   Social History     Tobacco Use    Smoking status: Current Every Day Smoker     Packs/day: 0.25    Smokeless tobacco: Never Used   Substance Use Topics    Alcohol use: Yes     Alcohol/week: 24.0 standard drinks     Types: 24 Cans of beer per week      History reviewed. No pertinent family history. No Known Allergies   Prior to Admission Medications   Prescriptions Last Dose Informant Patient Reported? Taking? amLODIPine (NORVASC) 10 mg tablet 2022 at Unknown time Self Yes Yes   Sig: Take 10 mg by mouth daily. atorvastatin (LIPITOR) 80 mg tablet   Yes Yes   Sig: Take 80 mg by mouth daily. carvediloL (COREG) 6.25 mg tablet 2022 at Unknown time  Yes Yes   Sig: Take  by mouth two (2) times daily (with meals). cloNIDine HCL (CATAPRES) 0.2 mg tablet 2022 at Unknown time  Yes Yes   Sig: Take  by mouth two (2) times a day. clopidogrel (PLAVIX) 75 mg tab 2022 Self Yes No   Sig: Take 75 mg by mouth daily. fluticasone (FLONASE) 50 mcg/actuation nasal spray 2022 at Unknown time Self Yes Yes   Si Sprays by Both Nostrils route two (2) times daily as needed for Rhinitis.    mv-min-C-glutamin-lysine-hb124 (AIRBORNE, LYSINE HCL,) 250-12.5 mg chew Not Taking at Unknown time Self Yes No   Sig: Take 1 Tab by mouth daily. Patient not taking: Reported on 7/12/2022      Facility-Administered Medications: None       PHYSICAL EXAM:  The patient is examined immediately prior to the procedure. Visit Vitals  BP (!) 155/109   Pulse 62   Temp 97.2 °F (36.2 °C)   Resp 22   Ht 5' 8\" (1.727 m)   Wt 108.5 kg (239 lb 3.2 oz)   SpO2 98%   BMI 36.37 kg/m²     Gen: Appears comfortable, no distress. Pulm: comfortable respirations with no abnormal audible breath sounds  CV: heart regular, well perfused  GI: abdomen flat. ASSESSMENT:  Patient here for procedure. The indication for the procedure, the patient's history and the patient's current medications are reviewed prior to the procedure and that data is reported on the endoscopy note in the chart to which this document is attached. PLAN:  Informed consent discussion held, patient afforded an opportunity to ask questions and all questions answered. After being advised of the risks, benefits, and alternatives, the patient requested that we proceed and indicated so on a written consent form. Will proceed with procedure as planned.   Yolanda Hidalgo MD

## 2022-07-12 NOTE — ANESTHESIA POSTPROCEDURE EVALUATION
Procedure(s):  COLONOSCOPY  ENDOSCOPIC POLYPECTOMY  RESOLUTION CLIP. MAC    Anesthesia Post Evaluation        Patient participation: complete - patient participated  Level of consciousness: awake  Pain management: adequate  Airway patency: patent  Anesthetic complications: no  Cardiovascular status: hemodynamically stable  Respiratory status: acceptable  Hydration status: acceptable  Comments: The patient is ready for PACU discharge. Elyse Baron DO                   Post anesthesia nausea and vomiting:  controlled      INITIAL Post-op Vital signs:   Vitals Value Taken Time   /99 07/12/22 1425   Temp 36.6 °C (97.8 °F) 07/12/22 1410   Pulse 62 07/12/22 1427   Resp 22 07/12/22 1427   SpO2 93 % 07/12/22 1427   Vitals shown include unvalidated device data.

## 2022-07-12 NOTE — PROGRESS NOTES
Sisto   1965  [id number]    Situation:  Verbal report received from: Scott Galvan RN  Procedure: Procedure(s):  COLONOSCOPY  ENDOSCOPIC POLYPECTOMY  RESOLUTION CLIP    Background:    Preoperative diagnosis: Polyp of colon, unspecified part of colon, unspecified type [K63.5]  Postoperative diagnosis: Diverticulosis  Colon Polyp    :  Dr. Cuco Whelan  Assistant(s): Endoscopy Technician-1: Alvaro Clay  Endoscopy RN-1: Savannah Chowdhury RN    Specimens:   ID Type Source Tests Collected by Time Destination   1 : polyp Preservative Colon, Ascending  Temi Morgan MD 2022 1348 Pathology   2 : polyps (2) Preservative Colon, Transverse  Temi Morgan MD 2022 1351 Pathology   3 : Large transverse colon polyp Preservative Colon, Transverse  Temi Morgan MD 2022 1354 Pathology   4 : polyp Preservative Sigmoid  Temi Morgan MD 2022 1358 Pathology     H. Pylori  no    Assessment:  Intra-procedure medications   Anesthesia gave intra-procedure sedation and medications, see anesthesia flow sheet yes    Intravenous fluids: NS@ KVO     Vital signs stable     Abdominal assessment: round and soft     Recommendation:  Discharge patient per MD order.   Family or Friend -ride only- Tete  Permission to share finding with family or friend no

## 2022-07-12 NOTE — PROCEDURES
[address] [facility]  [address] [facility] [address], [address]  [phone]                              Colonoscopy Procedure Note      Indications:    Personal history of colon polyps (screening only)     :  Cherie Schneider MD    Staff: Endoscopy Technician-1: Jorge A Ghotra IV  Endoscopy RN-1: Erasmo Vyas RN    Referring Provider: Lamxi Moore MD    Sedation: MAC    Procedure Details:  After informed consent was obtained with all risks and benefits of procedure explained and preoperative exam completed, the patient was taken to the endoscopy suite and placed in the left lateral decubitus position. Upon sequential sedation as per above, a digital rectal exam was performed per below. The Olympus videocolonoscope was inserted in the rectum and carefully advanced to the cecum, which was identified by the ileocecal valve and appendiceal orifice. The quality of preparation was good. [place] Bowel Prep Score : 3/2/2/7 . The colonoscope was slowly withdrawn with careful evaluation between folds. Retroflexion in the rectum was performed.      Findings:   Rectum: normal   Sigmoid: 8mm pedunculated polyp resected completely with cold snare and retrieved  Descending Colon: normal  Transverse Colon: Two 5mm sessile polyps resected completely with cold snare and retrieved  One 22mm semi-pedunculated polyp was resected with hot snare and retrieved with rothnet; 1x hemoclip was applied to resection site for vascular compression for primary hemorrhage prophylaxis  Ascending Colon: 7mm polyp resected completely with cold snare and retrieved  Cecum: normal       Interventions:  Polypectomies as above    Specimen Removed:    ID Type Source Tests Collected by Time Destination   1 : polyp Preservative Colon, Ascending  Ann-Marie Smith MD 7/12/2022 1348 Pathology   2 : polyps (2) Preservative Colon, Transverse  Ann-Marie Smith MD 7/12/2022 1351 Pathology   3 : Large transverse colon polyp Preservative Colon, Transverse  Jaja Morse MD 7/12/2022 1356 Pathology   4 : polyp Preservative Sigmoid  Jaja Morse MD 7/12/2022 1358 Pathology       Complications: None. EBL:  none    Impression:    See Postoperative diagnosis above    Recommendations:   - Await pathology evaluation of biopsy / resected tissue  - Resume normal medications EXCEPT plavix; restart this after 48 hours  - Resume previous diet. - Recommend repeat colonoscopy in 3 years    Discharge Disposition:  Home in the company of a  when able to ambulate.     Luther Vargas MD  7/12/2022  2:04 PM

## 2023-02-21 NOTE — PROGRESS NOTES
NAME: Derrell Terrell :  1965 MRN:  [id number] Assessment :    Plan: 
--MARYJO 
proteinuria Hypotension Volume depletion H/o CAD-Dr. Will Pearce 
  --Creatinine normal. 
 
I'll sign off. Please call if any questions. Subjective: Chief Complaint:  Koleen Ports I go home today? \"  Hungry. No N/V. No dyspnea. No dizzyness. Review of Systems: 
 
Symptom Y/N Comments  Symptom Y/N Comments Fever/Chills    Chest Pain Poor Appetite    Edema Cough    Abdominal Pain Sputum    Joint Pain SOB/GONZALEZ    Pruritis/Rash Nausea/vomit    Tolerating PT/OT Diarrhea    Tolerating Diet Constipation    Other Could not obtain due to:   
 
Objective: VITALS:  
Last 24hrs VS reviewed since prior progress note. Most recent are: 
Visit Vitals BP (!) 165/101 (BP 1 Location: Right arm, BP Patient Position: At rest) Pulse 78 Temp 98.8 °F (37.1 °C) Resp 20 Ht 5' 8\" (1.727 m) Wt 115.7 kg (255 lb) SpO2 100% BMI 38.77 kg/m² Intake/Output Summary (Last 24 hours) at 2019 3911 Last data filed at 2019 3471 Gross per 24 hour Intake 2720 ml Output 2125 ml Net 595 ml Telemetry Reviewed: PHYSICAL EXAM: 
General: NAd No edema Lab Data Reviewed: (see below) Medications Reviewed: (see below) PMH/SH reviewed - no change compared to H&P 
________________________________________________________________________ Care Plan discussed with: 
Patient Family RN Care Manager Consultant:     
 
  Comments >50% of visit spent in counseling and coordination of care    
 
________________________________________________________________________ Deanna Gamez MD  
 
Procedures: see electronic medical records for all procedures/Xrays and details which 
were not copied into this note but were reviewed prior to creation of Plan.   
 
LABS: 
Recent Labs 02/06/19 
0411 02/04/19 
1119 WBC 7.8 8.6 HGB 14.5 16.8 HCT 41.8 48.2 PLT 98* 120* Recent Labs 02/06/19 
0411 02/05/19 
5240 02/04/19 
1119  142 140  
K 4.3 4.0 4.0  
* 113* 106 CO2 21 22 24 BUN 18 31* 35* CREA 1.33* 1.99* 3.80* * 121* 142* CA 7.6* 7.8* 8.0*  
MG  --  2.4  --   
PHOS  --  4.4  --   
 
Recent Labs 02/04/19 
1119 SGOT 33 AP 68  
TP 7.4 ALB 3.2*  
GLOB 4.2* No results for input(s): INR, PTP, APTT in the last 72 hours. No lab exists for component: INREXT, INREXT No results for input(s): FE, TIBC, PSAT, FERR in the last 72 hours. No results found for: FOL, RBCF No results for input(s): PH, PCO2, PO2 in the last 72 hours. Recent Labs 02/04/19 
1119 * No components found for: [place] Lab Results Component Value Date/Time Color DARK YELLOW 02/04/2019 01:00 PM  
 Appearance CLOUDY (A) 02/04/2019 01:00 PM  
 Specific gravity 1.027 02/04/2019 01:00 PM  
 pH (UA) 5.0 02/04/2019 01:00 PM  
 Protein 100 (A) 02/04/2019 01:00 PM  
 Glucose NEGATIVE  02/04/2019 01:00 PM  
 Ketone TRACE (A) 02/04/2019 01:00 PM  
 Urobilinogen 1.0 02/04/2019 01:00 PM  
 Nitrites NEGATIVE  02/04/2019 01:00 PM  
 Leukocyte Esterase SMALL (A) 02/04/2019 01:00 PM  
 Epithelial cells FEW 02/04/2019 01:00 PM  
 Bacteria 2+ (A) 02/04/2019 01:00 PM  
 WBC 10-20 02/04/2019 01:00 PM  
 RBC 0-5 02/04/2019 01:00 PM  
 
 
MEDICATIONS: 
Current Facility-Administered Medications Medication Dose Route Frequency  cefTRIAXone (ROCEPHIN) 1 g in 0.9% sodium chloride (MBP/ADV) 50 mL  1 g IntraVENous Q24H  
 loratadine (CLARITIN) tablet 10 mg  10 mg Oral DAILY PRN  
 oxymetazoline (AFRIN) 0.05 % nasal spray 2 Spray  2 Spray Both Nostrils BID PRN  
 sodium chloride (NS) flush 5-40 mL  5-40 mL IntraVENous Q8H  
 sodium chloride (NS) flush 5-40 mL  5-40 mL IntraVENous PRN  
  0.9% sodium chloride infusion  75 mL/hr IntraVENous CONTINUOUS  
 ondansetron (ZOFRAN) injection 4 mg  4 mg IntraVENous Q4H PRN  
 heparin (porcine) injection 5,000 Units  5,000 Units SubCUTAneous Q8H  
 LORazepam (ATIVAN) injection 2 mg  2 mg IntraVENous Q1H PRN  
 LORazepam (ATIVAN) injection 4 mg  4 mg IntraVENous Z8O PRN  
 folic acid (FOLVITE) tablet 1 mg  1 mg Oral DAILY  thiamine mononitrate (B-1) tablet 100 mg  100 mg Oral DAILY  multivitamin, tx-iron-ca-min (THERA-M w/ IRON) tablet 1 Tab  1 Tab Oral DAILY  hydrALAZINE (APRESOLINE) 20 mg/mL injection 10 mg  10 mg IntraVENous Q6H PRN  
 clopidogrel (PLAVIX) tablet 75 mg  75 mg Oral DAILY  albuterol-ipratropium (DUO-NEB) 2.5 MG-0.5 MG/3 ML  3 mL Nebulization Q6H PRN  
 acetaminophen (TYLENOL) tablet 650 mg  650 mg Oral Q6H PRN  
 lidocaine (XYLOCAINE) 2 % viscous solution 15 mL  15 mL Mouth/Throat Q6H PRN  
 
 
 walking

## (undated) DEVICE — RETRIEVER ENDOSCP L230CM DIA2.5MM NET W3XL5.5CM MIN WRK CHN

## (undated) DEVICE — SNARE ENDOSCP XL W33MMXL240CM SHTH DIA2.4MM COLON STIFF

## (undated) DEVICE — SNARE VASC L240CM LOOP W10MM SHTH DIA2.4MM RND STIFF CLD